# Patient Record
Sex: FEMALE | Race: OTHER | Employment: OTHER | URBAN - METROPOLITAN AREA
[De-identification: names, ages, dates, MRNs, and addresses within clinical notes are randomized per-mention and may not be internally consistent; named-entity substitution may affect disease eponyms.]

---

## 2020-02-25 ENCOUNTER — APPOINTMENT (OUTPATIENT)
Dept: GENERAL RADIOLOGY | Facility: HOSPITAL | Age: 80
DRG: 446 | End: 2020-02-25
Attending: EMERGENCY MEDICINE
Payer: MEDICARE

## 2020-02-25 ENCOUNTER — HOSPITAL ENCOUNTER (INPATIENT)
Facility: HOSPITAL | Age: 80
LOS: 4 days | Discharge: NURSING, REHAB OR LONG-TERM CARE | DRG: 446 | End: 2020-02-29
Attending: EMERGENCY MEDICINE | Admitting: INTERNAL MEDICINE
Payer: MEDICARE

## 2020-02-25 ENCOUNTER — APPOINTMENT (OUTPATIENT)
Dept: ULTRASOUND IMAGING | Facility: HOSPITAL | Age: 80
DRG: 446 | End: 2020-02-25
Attending: EMERGENCY MEDICINE
Payer: MEDICARE

## 2020-02-25 DIAGNOSIS — K83.1 BILIARY STRICTURE: ICD-10-CM

## 2020-02-25 DIAGNOSIS — K80.33 CALCULUS OF BILE DUCT WITH ACUTE CHOLANGITIS WITH OBSTRUCTION: Primary | ICD-10-CM

## 2020-02-25 DIAGNOSIS — D64.9 ANEMIA, UNSPECIFIED TYPE: ICD-10-CM

## 2020-02-25 DIAGNOSIS — R74.8 ELEVATED LIVER ENZYMES: ICD-10-CM

## 2020-02-25 PROBLEM — D72.829 LEUKOCYTOSIS: Status: ACTIVE | Noted: 2020-02-25

## 2020-02-25 LAB
ALBUMIN SERPL-MCNC: 2.2 G/DL (ref 3.4–5)
ALP LIVER SERPL-CCNC: 329 U/L (ref 55–142)
ALT SERPL-CCNC: 53 U/L (ref 13–56)
ANION GAP SERPL CALC-SCNC: 6 MMOL/L (ref 0–18)
AST SERPL-CCNC: 86 U/L (ref 15–37)
BASOPHILS # BLD AUTO: 0.1 X10(3) UL (ref 0–0.2)
BASOPHILS NFR BLD AUTO: 0.4 %
BILIRUB DIRECT SERPL-MCNC: 2.3 MG/DL (ref 0–0.2)
BILIRUB SERPL-MCNC: 3.2 MG/DL (ref 0.1–2)
BILIRUB UR QL: NEGATIVE
BUN BLD-MCNC: 39 MG/DL (ref 7–18)
BUN/CREAT SERPL: 40.6 (ref 10–20)
CALCIUM BLD-MCNC: 8.8 MG/DL (ref 8.5–10.1)
CHLORIDE SERPL-SCNC: 101 MMOL/L (ref 98–112)
CLARITY UR: CLEAR
CO2 SERPL-SCNC: 29 MMOL/L (ref 21–32)
COLOR UR: YELLOW
CREAT BLD-MCNC: 0.96 MG/DL (ref 0.55–1.02)
DEPRECATED RDW RBC AUTO: 54.6 FL (ref 35.1–46.3)
EOSINOPHIL # BLD AUTO: 0.02 X10(3) UL (ref 0–0.7)
EOSINOPHIL NFR BLD AUTO: 0.1 %
ERYTHROCYTE [DISTWIDTH] IN BLOOD BY AUTOMATED COUNT: 16.4 % (ref 11–15)
GLUCOSE BLD-MCNC: 100 MG/DL (ref 70–99)
GLUCOSE UR-MCNC: NEGATIVE MG/DL
HCT VFR BLD AUTO: 31.4 % (ref 35–48)
HGB BLD-MCNC: 10.3 G/DL (ref 12–16)
IMM GRANULOCYTES # BLD AUTO: 0.12 X10(3) UL (ref 0–1)
IMM GRANULOCYTES NFR BLD: 0.5 %
KETONES UR-MCNC: NEGATIVE MG/DL
LACTATE SERPL-SCNC: 1.1 MMOL/L (ref 0.4–2)
LYMPHOCYTES # BLD AUTO: 1.25 X10(3) UL (ref 1–4)
LYMPHOCYTES NFR BLD AUTO: 5 %
M PROTEIN MFR SERPL ELPH: 6.9 G/DL (ref 6.4–8.2)
MCH RBC QN AUTO: 30.1 PG (ref 26–34)
MCHC RBC AUTO-ENTMCNC: 32.8 G/DL (ref 31–37)
MCV RBC AUTO: 91.8 FL (ref 80–100)
MONOCYTES # BLD AUTO: 0.94 X10(3) UL (ref 0.1–1)
MONOCYTES NFR BLD AUTO: 3.7 %
NEUTROPHILS # BLD AUTO: 22.69 X10 (3) UL (ref 1.5–7.7)
NEUTROPHILS # BLD AUTO: 22.69 X10(3) UL (ref 1.5–7.7)
NEUTROPHILS NFR BLD AUTO: 90.3 %
NITRITE UR QL STRIP.AUTO: NEGATIVE
OSMOLALITY SERPL CALC.SUM OF ELEC: 291 MOSM/KG (ref 275–295)
PH UR: 5 [PH] (ref 5–8)
PLATELET # BLD AUTO: 189 10(3)UL (ref 150–450)
POTASSIUM SERPL-SCNC: 3.3 MMOL/L (ref 3.5–5.1)
PROT UR-MCNC: NEGATIVE MG/DL
RBC # BLD AUTO: 3.42 X10(6)UL (ref 3.8–5.3)
RBC #/AREA URNS AUTO: 1 /HPF
SODIUM SERPL-SCNC: 136 MMOL/L (ref 136–145)
SP GR UR STRIP: 1.01 (ref 1–1.03)
UROBILINOGEN UR STRIP-ACNC: <2
WBC # BLD AUTO: 25.1 X10(3) UL (ref 4–11)
WBC #/AREA URNS AUTO: 6 /HPF

## 2020-02-25 PROCEDURE — 71045 X-RAY EXAM CHEST 1 VIEW: CPT | Performed by: EMERGENCY MEDICINE

## 2020-02-25 PROCEDURE — 76705 ECHO EXAM OF ABDOMEN: CPT | Performed by: EMERGENCY MEDICINE

## 2020-02-25 RX ORDER — SODIUM CHLORIDE 9 MG/ML
INJECTION, SOLUTION INTRAVENOUS CONTINUOUS
Status: DISCONTINUED | OUTPATIENT
Start: 2020-02-25 | End: 2020-02-27

## 2020-02-25 RX ORDER — ACETAMINOPHEN 325 MG/1
650 TABLET ORAL EVERY 6 HOURS PRN
Status: DISCONTINUED | OUTPATIENT
Start: 2020-02-25 | End: 2020-02-29

## 2020-02-25 RX ORDER — ZOLPIDEM TARTRATE 5 MG/1
5 TABLET ORAL NIGHTLY PRN
COMMUNITY

## 2020-02-25 RX ORDER — MORPHINE SULFATE 4 MG/ML
2 INJECTION, SOLUTION INTRAMUSCULAR; INTRAVENOUS ONCE
Status: COMPLETED | OUTPATIENT
Start: 2020-02-25 | End: 2020-02-25

## 2020-02-25 RX ORDER — ONDANSETRON 2 MG/ML
4 INJECTION INTRAMUSCULAR; INTRAVENOUS EVERY 6 HOURS PRN
Status: DISCONTINUED | OUTPATIENT
Start: 2020-02-25 | End: 2020-02-29

## 2020-02-25 RX ORDER — MULTIVIT-MIN/FOLIC ACID/LUTEIN 400-250MCG
1 TABLET,CHEWABLE ORAL DAILY
COMMUNITY

## 2020-02-25 RX ORDER — MORPHINE SULFATE 2 MG/ML
1 INJECTION, SOLUTION INTRAMUSCULAR; INTRAVENOUS EVERY 4 HOURS PRN
Status: DISCONTINUED | OUTPATIENT
Start: 2020-02-25 | End: 2020-02-29

## 2020-02-25 RX ORDER — HEPARIN SODIUM 5000 [USP'U]/ML
5000 INJECTION, SOLUTION INTRAVENOUS; SUBCUTANEOUS EVERY 8 HOURS SCHEDULED
Status: DISCONTINUED | OUTPATIENT
Start: 2020-02-25 | End: 2020-02-26

## 2020-02-25 RX ORDER — SODIUM CHLORIDE 9 MG/ML
INJECTION, SOLUTION INTRAVENOUS CONTINUOUS
Status: DISCONTINUED | OUTPATIENT
Start: 2020-02-25 | End: 2020-02-29

## 2020-02-25 RX ORDER — SODIUM CHLORIDE 9 MG/ML
INJECTION, SOLUTION INTRAVENOUS CONTINUOUS
Status: ACTIVE | OUTPATIENT
Start: 2020-02-25 | End: 2020-02-26

## 2020-02-25 NOTE — ED INITIAL ASSESSMENT (HPI)
Triage: pt arrived via Private ambulance for elevated WBC count 27.46 per lab sheet, from [facility] rehab, pt reports fell and broke R femur on 2/22 and had surgery and then sent from rehab

## 2020-02-26 ENCOUNTER — APPOINTMENT (OUTPATIENT)
Dept: MRI IMAGING | Facility: HOSPITAL | Age: 80
DRG: 446 | End: 2020-02-26
Attending: INTERNAL MEDICINE
Payer: MEDICARE

## 2020-02-26 ENCOUNTER — APPOINTMENT (OUTPATIENT)
Dept: GENERAL RADIOLOGY | Facility: HOSPITAL | Age: 80
DRG: 446 | End: 2020-02-26
Attending: INTERNAL MEDICINE
Payer: MEDICARE

## 2020-02-26 ENCOUNTER — ANESTHESIA (OUTPATIENT)
Dept: ENDOSCOPY | Facility: HOSPITAL | Age: 80
DRG: 446 | End: 2020-02-26
Payer: MEDICARE

## 2020-02-26 ENCOUNTER — ANESTHESIA EVENT (OUTPATIENT)
Dept: ENDOSCOPY | Facility: HOSPITAL | Age: 80
DRG: 446 | End: 2020-02-26
Payer: MEDICARE

## 2020-02-26 LAB
ALBUMIN SERPL-MCNC: 1.8 G/DL (ref 3.4–5)
ALBUMIN/GLOB SERPL: 0.5 {RATIO} (ref 1–2)
ALP LIVER SERPL-CCNC: 239 U/L (ref 55–142)
ALT SERPL-CCNC: 32 U/L (ref 13–56)
ANION GAP SERPL CALC-SCNC: 3 MMOL/L (ref 0–18)
AST SERPL-CCNC: 35 U/L (ref 15–37)
BASOPHILS # BLD AUTO: 0.08 X10(3) UL (ref 0–0.2)
BASOPHILS NFR BLD AUTO: 0.5 %
BILIRUB SERPL-MCNC: 1.9 MG/DL (ref 0.1–2)
BUN BLD-MCNC: 31 MG/DL (ref 7–18)
BUN/CREAT SERPL: 50 (ref 10–20)
CALCIUM BLD-MCNC: 8.4 MG/DL (ref 8.5–10.1)
CHLORIDE SERPL-SCNC: 111 MMOL/L (ref 98–112)
CO2 SERPL-SCNC: 27 MMOL/L (ref 21–32)
CREAT BLD-MCNC: 0.62 MG/DL (ref 0.55–1.02)
DEPRECATED HBV CORE AB SER IA-ACNC: 94.8 NG/ML (ref 18–340)
DEPRECATED RDW RBC AUTO: 55.3 FL (ref 35.1–46.3)
EOSINOPHIL # BLD AUTO: 0.02 X10(3) UL (ref 0–0.7)
EOSINOPHIL NFR BLD AUTO: 0.1 %
ERYTHROCYTE [DISTWIDTH] IN BLOOD BY AUTOMATED COUNT: 16.4 % (ref 11–15)
GLOBULIN PLAS-MCNC: 3.9 G/DL (ref 2.8–4.4)
GLUCOSE BLD-MCNC: 82 MG/DL (ref 70–99)
HCT VFR BLD AUTO: 26.7 % (ref 35–48)
HGB BLD-MCNC: 8.5 G/DL (ref 12–16)
IMM GRANULOCYTES # BLD AUTO: 0.1 X10(3) UL (ref 0–1)
IMM GRANULOCYTES NFR BLD: 0.7 %
IRON SATURATION: 22 % (ref 15–50)
IRON SERPL-MCNC: 50 UG/DL (ref 50–170)
LYMPHOCYTES # BLD AUTO: 1.46 X10(3) UL (ref 1–4)
LYMPHOCYTES NFR BLD AUTO: 9.7 %
M PROTEIN MFR SERPL ELPH: 5.7 G/DL (ref 6.4–8.2)
MCH RBC QN AUTO: 29.7 PG (ref 26–34)
MCHC RBC AUTO-ENTMCNC: 31.8 G/DL (ref 31–37)
MCV RBC AUTO: 93.4 FL (ref 80–100)
MONOCYTES # BLD AUTO: 0.94 X10(3) UL (ref 0.1–1)
MONOCYTES NFR BLD AUTO: 6.3 %
NEUTROPHILS # BLD AUTO: 12.42 X10 (3) UL (ref 1.5–7.7)
NEUTROPHILS # BLD AUTO: 12.42 X10(3) UL (ref 1.5–7.7)
NEUTROPHILS NFR BLD AUTO: 82.7 %
OSMOLALITY SERPL CALC.SUM OF ELEC: 298 MOSM/KG (ref 275–295)
PLATELET # BLD AUTO: 145 10(3)UL (ref 150–450)
POTASSIUM SERPL-SCNC: 4.2 MMOL/L (ref 3.5–5.1)
RBC # BLD AUTO: 2.86 X10(6)UL (ref 3.8–5.3)
SODIUM SERPL-SCNC: 141 MMOL/L (ref 136–145)
TOTAL IRON BINDING CAPACITY: 224 UG/DL (ref 240–450)
TRANSFERRIN SERPL-MCNC: 150 MG/DL (ref 200–360)
WBC # BLD AUTO: 15 X10(3) UL (ref 4–11)

## 2020-02-26 PROCEDURE — 0FC78ZZ EXTIRPATION OF MATTER FROM COMMON HEPATIC DUCT, VIA NATURAL OR ARTIFICIAL OPENING ENDOSCOPIC: ICD-10-PCS | Performed by: INTERNAL MEDICINE

## 2020-02-26 PROCEDURE — 74330 X-RAY BILE/PANC ENDOSCOPY: CPT | Performed by: INTERNAL MEDICINE

## 2020-02-26 PROCEDURE — 99223 1ST HOSP IP/OBS HIGH 75: CPT | Performed by: INTERNAL MEDICINE

## 2020-02-26 PROCEDURE — 43264 ERCP REMOVE DUCT CALCULI: CPT | Performed by: INTERNAL MEDICINE

## 2020-02-26 PROCEDURE — 0FB78ZX EXCISION OF COMMON HEPATIC DUCT, VIA NATURAL OR ARTIFICIAL OPENING ENDOSCOPIC, DIAGNOSTIC: ICD-10-PCS | Performed by: INTERNAL MEDICINE

## 2020-02-26 PROCEDURE — 76376 3D RENDER W/INTRP POSTPROCES: CPT | Performed by: INTERNAL MEDICINE

## 2020-02-26 PROCEDURE — 0F778DZ DILATION OF COMMON HEPATIC DUCT WITH INTRALUMINAL DEVICE, VIA NATURAL OR ARTIFICIAL OPENING ENDOSCOPIC: ICD-10-PCS | Performed by: INTERNAL MEDICINE

## 2020-02-26 PROCEDURE — 43262 ENDO CHOLANGIOPANCREATOGRAPH: CPT | Performed by: INTERNAL MEDICINE

## 2020-02-26 PROCEDURE — 74181 MRI ABDOMEN W/O CONTRAST: CPT | Performed by: INTERNAL MEDICINE

## 2020-02-26 DEVICE — COTTON-LEUNG BILIARY STENT
Type: IMPLANTABLE DEVICE | Status: FUNCTIONAL
Brand: COTTON-LEUNG

## 2020-02-26 RX ORDER — VITAMIN E 268 MG
400 CAPSULE ORAL DAILY
COMMUNITY

## 2020-02-26 RX ORDER — ZINC SULFATE 50(220)MG
50 CAPSULE ORAL DAILY
COMMUNITY

## 2020-02-26 RX ORDER — ROCURONIUM BROMIDE 10 MG/ML
INJECTION, SOLUTION INTRAVENOUS AS NEEDED
Status: DISCONTINUED | OUTPATIENT
Start: 2020-02-26 | End: 2020-02-26 | Stop reason: SURG

## 2020-02-26 RX ORDER — DEXAMETHASONE SODIUM PHOSPHATE 4 MG/ML
VIAL (ML) INJECTION AS NEEDED
Status: DISCONTINUED | OUTPATIENT
Start: 2020-02-26 | End: 2020-02-26 | Stop reason: SURG

## 2020-02-26 RX ORDER — OMEPRAZOLE 20 MG/1
20 CAPSULE, DELAYED RELEASE ORAL NIGHTLY
COMMUNITY

## 2020-02-26 RX ORDER — MAGNESIUM HYDROXIDE/ALUMINUM HYDROXICE/SIMETHICONE 120; 1200; 1200 MG/30ML; MG/30ML; MG/30ML
15 SUSPENSION ORAL 4 TIMES DAILY PRN
Status: DISCONTINUED | OUTPATIENT
Start: 2020-02-26 | End: 2020-02-29

## 2020-02-26 RX ORDER — ASPIRIN 81 MG/1
81 TABLET, CHEWABLE ORAL DAILY
Status: DISCONTINUED | OUTPATIENT
Start: 2020-02-26 | End: 2020-02-29

## 2020-02-26 RX ORDER — HYDROMORPHONE HYDROCHLORIDE 1 MG/ML
0.6 INJECTION, SOLUTION INTRAMUSCULAR; INTRAVENOUS; SUBCUTANEOUS EVERY 5 MIN PRN
Status: DISCONTINUED | OUTPATIENT
Start: 2020-02-26 | End: 2020-02-26 | Stop reason: HOSPADM

## 2020-02-26 RX ORDER — MORPHINE SULFATE 4 MG/ML
2 INJECTION, SOLUTION INTRAMUSCULAR; INTRAVENOUS EVERY 10 MIN PRN
Status: DISCONTINUED | OUTPATIENT
Start: 2020-02-26 | End: 2020-02-26 | Stop reason: HOSPADM

## 2020-02-26 RX ORDER — CALCIUM CARBONATE 200(500)MG
1 TABLET,CHEWABLE ORAL EVERY 4 HOURS PRN
COMMUNITY

## 2020-02-26 RX ORDER — NEOSTIGMINE METHYLSULFATE 1 MG/ML
INJECTION INTRAVENOUS AS NEEDED
Status: DISCONTINUED | OUTPATIENT
Start: 2020-02-26 | End: 2020-02-26 | Stop reason: SURG

## 2020-02-26 RX ORDER — HYDROCODONE BITARTRATE AND ACETAMINOPHEN 5; 325 MG/1; MG/1
2 TABLET ORAL AS NEEDED
Status: DISCONTINUED | OUTPATIENT
Start: 2020-02-26 | End: 2020-02-26 | Stop reason: HOSPADM

## 2020-02-26 RX ORDER — LIDOCAINE HYDROCHLORIDE 10 MG/ML
INJECTION, SOLUTION EPIDURAL; INFILTRATION; INTRACAUDAL; PERINEURAL AS NEEDED
Status: DISCONTINUED | OUTPATIENT
Start: 2020-02-26 | End: 2020-02-26 | Stop reason: SURG

## 2020-02-26 RX ORDER — HYDROMORPHONE HYDROCHLORIDE 1 MG/ML
0.2 INJECTION, SOLUTION INTRAMUSCULAR; INTRAVENOUS; SUBCUTANEOUS EVERY 5 MIN PRN
Status: DISCONTINUED | OUTPATIENT
Start: 2020-02-26 | End: 2020-02-26 | Stop reason: HOSPADM

## 2020-02-26 RX ORDER — HYDROCODONE BITARTRATE AND ACETAMINOPHEN 5; 325 MG/1; MG/1
1 TABLET ORAL AS NEEDED
Status: DISCONTINUED | OUTPATIENT
Start: 2020-02-26 | End: 2020-02-26 | Stop reason: HOSPADM

## 2020-02-26 RX ORDER — PROCHLORPERAZINE EDISYLATE 5 MG/ML
5 INJECTION INTRAMUSCULAR; INTRAVENOUS ONCE AS NEEDED
Status: COMPLETED | OUTPATIENT
Start: 2020-02-26 | End: 2020-02-26

## 2020-02-26 RX ORDER — MORPHINE SULFATE 4 MG/ML
4 INJECTION, SOLUTION INTRAMUSCULAR; INTRAVENOUS EVERY 10 MIN PRN
Status: DISCONTINUED | OUTPATIENT
Start: 2020-02-26 | End: 2020-02-26 | Stop reason: HOSPADM

## 2020-02-26 RX ORDER — ACETAMINOPHEN 500 MG
500 TABLET ORAL EVERY 4 HOURS PRN
COMMUNITY

## 2020-02-26 RX ORDER — HYDROCODONE BITARTRATE AND ACETAMINOPHEN 5; 325 MG/1; MG/1
1 TABLET ORAL EVERY 6 HOURS PRN
Status: DISCONTINUED | OUTPATIENT
Start: 2020-02-26 | End: 2020-02-29

## 2020-02-26 RX ORDER — GLYCOPYRROLATE 0.2 MG/ML
INJECTION INTRAMUSCULAR; INTRAVENOUS AS NEEDED
Status: DISCONTINUED | OUTPATIENT
Start: 2020-02-26 | End: 2020-02-26 | Stop reason: SURG

## 2020-02-26 RX ORDER — SODIUM CHLORIDE, SODIUM LACTATE, POTASSIUM CHLORIDE, CALCIUM CHLORIDE 600; 310; 30; 20 MG/100ML; MG/100ML; MG/100ML; MG/100ML
INJECTION, SOLUTION INTRAVENOUS CONTINUOUS
Status: DISCONTINUED | OUTPATIENT
Start: 2020-02-26 | End: 2020-02-26 | Stop reason: HOSPADM

## 2020-02-26 RX ORDER — NALOXONE HYDROCHLORIDE 0.4 MG/ML
80 INJECTION, SOLUTION INTRAMUSCULAR; INTRAVENOUS; SUBCUTANEOUS AS NEEDED
Status: DISCONTINUED | OUTPATIENT
Start: 2020-02-26 | End: 2020-02-26 | Stop reason: HOSPADM

## 2020-02-26 RX ORDER — ZOLPIDEM TARTRATE 5 MG/1
5 TABLET ORAL NIGHTLY PRN
Status: DISCONTINUED | OUTPATIENT
Start: 2020-02-26 | End: 2020-02-29

## 2020-02-26 RX ORDER — ENOXAPARIN SODIUM 100 MG/ML
40 INJECTION SUBCUTANEOUS DAILY
COMMUNITY
End: 2021-03-10 | Stop reason: ALTCHOICE

## 2020-02-26 RX ORDER — ONDANSETRON 2 MG/ML
4 INJECTION INTRAMUSCULAR; INTRAVENOUS ONCE AS NEEDED
Status: DISCONTINUED | OUTPATIENT
Start: 2020-02-26 | End: 2020-02-26 | Stop reason: HOSPADM

## 2020-02-26 RX ORDER — HYDROCODONE BITARTRATE AND ACETAMINOPHEN 5; 325 MG/1; MG/1
1 TABLET ORAL EVERY 6 HOURS PRN
Status: ON HOLD | COMMUNITY
End: 2020-02-29

## 2020-02-26 RX ORDER — MORPHINE SULFATE 10 MG/ML
6 INJECTION, SOLUTION INTRAMUSCULAR; INTRAVENOUS EVERY 10 MIN PRN
Status: DISCONTINUED | OUTPATIENT
Start: 2020-02-26 | End: 2020-02-26 | Stop reason: HOSPADM

## 2020-02-26 RX ORDER — HYDROMORPHONE HYDROCHLORIDE 1 MG/ML
0.4 INJECTION, SOLUTION INTRAMUSCULAR; INTRAVENOUS; SUBCUTANEOUS EVERY 5 MIN PRN
Status: DISCONTINUED | OUTPATIENT
Start: 2020-02-26 | End: 2020-02-26 | Stop reason: HOSPADM

## 2020-02-26 RX ORDER — HALOPERIDOL 5 MG/ML
0.25 INJECTION INTRAMUSCULAR ONCE AS NEEDED
Status: DISCONTINUED | OUTPATIENT
Start: 2020-02-26 | End: 2020-02-26 | Stop reason: HOSPADM

## 2020-02-26 RX ORDER — PANTOPRAZOLE SODIUM 40 MG/1
40 TABLET, DELAYED RELEASE ORAL
Status: DISCONTINUED | OUTPATIENT
Start: 2020-02-26 | End: 2020-02-29

## 2020-02-26 RX ORDER — ALUMINA, MAGNESIA, AND SIMETHICONE 2400; 2400; 240 MG/30ML; MG/30ML; MG/30ML
15 SUSPENSION ORAL 4 TIMES DAILY PRN
COMMUNITY

## 2020-02-26 RX ADMIN — GLYCOPYRROLATE 0.6 MG: 0.2 INJECTION INTRAMUSCULAR; INTRAVENOUS at 17:11:00

## 2020-02-26 RX ADMIN — DEXAMETHASONE SODIUM PHOSPHATE 8 MG: 4 MG/ML VIAL (ML) INJECTION at 16:32:00

## 2020-02-26 RX ADMIN — ROCURONIUM BROMIDE 40 MG: 10 INJECTION, SOLUTION INTRAVENOUS at 16:20:00

## 2020-02-26 RX ADMIN — SODIUM CHLORIDE: 9 INJECTION, SOLUTION INTRAVENOUS at 17:14:00

## 2020-02-26 RX ADMIN — NEOSTIGMINE METHYLSULFATE 5 MG: 1 INJECTION INTRAVENOUS at 17:11:00

## 2020-02-26 RX ADMIN — SODIUM CHLORIDE: 9 INJECTION, SOLUTION INTRAVENOUS at 16:18:00

## 2020-02-26 RX ADMIN — LIDOCAINE HYDROCHLORIDE 50 MG: 10 INJECTION, SOLUTION EPIDURAL; INFILTRATION; INTRACAUDAL; PERINEURAL at 16:20:00

## 2020-02-26 NOTE — ADDENDUM NOTE
Addendum  created 02/26/20 1726 by Radhames Jones, CRNA    Review and Sign - Ready for Procedure, Review and Sign - Signed

## 2020-02-26 NOTE — CONSULTS
[facility]   Gastroenterology Consultation Note    Douglas Palmyra  Patient Status:    Inpatient  Date of Admission:         2/25/2020, Hospital day #1  Attending:   Josefina Boone MD  PCP:     No primary care provider on file. She says she has had a colonoscopy but cannot remember when it was several years ago, thinks it was normal.    Denies any smoking or alcohol use.     History:  Past Medical History:   Diagnosis Date   • Anemia    • Arthritis    • Diverticulitis    • Esophag intolerance and heat intolerance  MUSCULOSKELETAL:  negative for joint effusion/severe erythema  BEHAVIOR/PSYCH:  negative for psychotic behavior    Physical Exam:    Blood pressure 115/58, pulse 88, temperature 97.9 °F (36.6 °C), temperature source Oral, with on and off symptoms of what appeared to be biliary colic, have worsened in the last 1 week. She recently also had a left leg fracture and is recovering after surgery and rehabilitation. Her mobility is somewhat impaired.   Her LFTs have improved over

## 2020-02-26 NOTE — PHYSICAL THERAPY NOTE
PHYSICAL THERAPY EVALUATION - INPATIENT     Room Number: 534/534-A  Evaluation Date: 2/26/2020  Type of Evaluation: Initial   Physician Order: PT Eval and Treat    Presenting Problem: s/p ORIF LLE femur fx sx 2/22(came from [facility])  Reason for Therapy: Mobil this time staff A x 2 with all transfers. Patient will benefit from continued IP PT services to address these deficits in preparation for discharge.     DISCHARGE RECOMMENDATIONS  PT Discharge Recommendations: Sub-acute rehabilitation(PT / OT)    PLAN  P 3  Location: LLE   Management Techniques: Activity promotion; Body mechanics;Breathing techniques;Relaxation;Repositioning    COGNITION  · Overall Cognitive Status:  WFL - within functional limits    RANGE OF MOTION AND STRENGTH ASSESSMENT  Upper extremity mobility  Body mechanics  Energy conservation  Functional activity tolerated  Gait training  Posture  Strengthening  Lower therapeutic exercise: Ankle pumps  Heel slides  SLR  Transfer training    Patient End of Session: Up in chair; With [facility] staff;Needs me

## 2020-02-26 NOTE — H&P
History & Physical Examination    Patient Name: Albertina Sy  MRN: [id number]  CSN: [id number]  YOB: 1940    Diagnosis: elevated liver enzymes, choledocholithiasis, abnormal MRCP        Enoxaparin Sodium 40 MG/0.4ML Subcutaneous Soluti PRN  Pantoprazole Sodium (PROTONIX) EC tab 40 mg, 40 mg, Oral, QAM AC  Zolpidem Tartrate (AMBIEN) tab 5 mg, 5 mg, Oral, Nightly PRN  Piperacillin Sod-Tazobactam So (ZOSYN) 3.375 g in dextrose 5 % 100 mL ADD-vantage, 3.375 g, Intravenous, Q8H  0.9% NaCl inf anti-coagulation. Seems she had been receiving enoxaparin injections at her nursing facility last yesterday morning. Has only been getting sub q heparin here for DVT prophylaxis and thus ok to proceed at this time.     ERCP Consent: I have discussed the ris

## 2020-02-26 NOTE — ANESTHESIA PROCEDURE NOTES
Airway  Date/Time: 2/26/2020 4:24 PM  Urgency: elective    Airway not difficult    General Information and Staff    Patient location during procedure: endo  Resident/CRNA: Donna Her, CRNA  Performed: CRNA     Indications and Patient Condition

## 2020-02-26 NOTE — ED PROVIDER NOTES
Patient Seen in: [facility] AND [facility] Emergency Department      History   Patient presents with:  Abnormal Labs    Stated Complaint: abnormal labs    HPI    The patient is a 66-year-old female who is status post ORIF of her left femur on 2/22 at [facility] Conjunctivae normal.      Pupils: Pupils are equal, round, and reactive to light. Neck:      Musculoskeletal: Normal range of motion and neck supple. Vascular: No JVD. Cardiovascular:      Rate and Rhythm: Normal rate and regular rhythm.       Hear All other components within normal limits   CBC W/ DIFFERENTIAL - Abnormal; Notable for the following components:    WBC 25.1 (*)     RBC 3.42 (*)     HGB 10.3 (*)     HCT 31.4 (*)     RDW-SD 54.6 (*)     RDW 16.4 (*)     Neutrophil Absolute Prelim 22.69 ( obstruction  (primary encounter diagnosis)  Anemia, unspecified type    Disposition:  Admit  2/25/2020  7:08 pm    Follow-up:  No follow-up provider specified.   We recommend that you schedule follow up care with a primary care provider within the next thre

## 2020-02-26 NOTE — CM/SW NOTE
CM met with pt at bedside. Pt is agreeable to return to [facility] to complete rehab. Paige liaison notified, updates sent via [address] [place] [address]. / to remain available for support and/or discharge planning.      Laz Joy RN  Case Manag

## 2020-02-26 NOTE — H&P
[address] [facility] Patient Status:  Inpatient    1940 MRN [id number]   Location [facility] 5SW/SE Attending Joni Germain MD   [facility] Day # 1 PCP No primary care provider on file.      Date: 2/25/2020 at Unknown time  omeprazole 20 MG Oral Capsule Delayed Release, Take 20 mg by mouth nightly., Disp: , Rfl: , 2/25/2020 at Unknown time  Calcium Carbonate Antacid 500 MG Oral Chew Tab, Chew 1 tablet by mouth every 4 (four) hours as needed for Hear distress.   HEENT: PERRLA  Neck: Supple, no carotid bruit or JVD  Lungs: Clear to auscultation bilaterally, No wheezes, crackles  Heart: Regular rate and rhythm, S1 and S2 normal, no murmur, rub or gallop  Abdomen: Soft, non-tender, non distended, Bs+, no o

## 2020-02-26 NOTE — PAYOR COMM NOTE
--------------  ADMISSION REVIEW     PayorSamie Fees [facility]  Subscriber #:  [id number]  Authorization Number: [id number]    Admit date: 2/25/20  Admit time: 2246       Admitting Physician: Telma Sánchez MD  Attending Physician:  Telma Sánchez MD  Primary Car METABOLIC PANEL (8) - Abnormal; Notable for the following components:       Result Value    Glucose 100 (*)     Potassium 3.3 (*)     BUN 39 (*)     BUN/CREA Ratio 40.6 (*)     GFR, Non- 56 (*)     All other components within normal limits unremarkable.   OTHER:   There is no hydronephrosis of the right kidney.     CONCLUSION:      Dilated common bile duct containing a 3.3 cm echogenic focus along the proximal aspect of the common bile duct suspicious for choledocholithiasis.     No intrahepa improved to 1.9 today. Her AST ALT also normalized. Her alk phos is mildly elevated as well. She had a leukocytosis to 5 25,000, which improved to 15,000 today after fluids and antibiotics. Her hemoglobin also declined to 8.5 from 10. 3.     Currently s choledocholithiasis. Her ultrasound does show a dilated common bile duct continued 3.3 cm focus in the proximal aspect of the common bile duct. We will proceed with MRCP for better evaluation and to confirm no malignancy is present.   I discussed with her Dose Route User    2/26/2020 0037 New Bag 40 mEq Intravenous Jim Neil RN      0.9% NaCl infusion     Date Action Dose Route User    2/25/2020 1635 New Bag (none) Intravenous Justin Beckford RN      0.9% NaCl infusion     Date Action Dose Route Use

## 2020-02-26 NOTE — ANESTHESIA POSTPROCEDURE EVALUATION
Patient: Napoleon Alba    Procedure Summary     Date:  02/26/20 Room / Location:  [address] [place] [address] ENDOSCOPY 05 / [address] [place] [address] ENDOSCOPY    Anesthesia Start:  2074 Anesthesia Stop:      Procedure:  ENDOSCOPIC RETROGRADE CHOLANGIOPANCREATOGRAPHY (ERCP) (N/A ) Diagnosis:

## 2020-02-26 NOTE — ANESTHESIA PREPROCEDURE EVALUATION
Anesthesia PreOp Note    HPI:     Bettina Louie is a 78year old female who presents for preoperative consultation requested by: Denis Burns MD    Date of Surgery: 2/25/2020 - 2/26/2020    Procedure(s):  ENDOSCOPIC RETROGRADE [place] Unknown time  zinc sulfate 220 (50 Zn) MG Oral Cap, Take 50 mg by mouth daily. , Disp: , Rfl: , 2/25/2020 at Unknown time  Zolpidem Tartrate 5 MG Oral Tab, Take 5 mg by mouth nightly as needed for Sleep., Disp: , Rfl: , 2/25/2020 at Unknown time  aspirin 81 Spouse name: Not on file      Number of children: Not on file      Years of education: Not on file      Highest education level: Not on file    Occupational History      Not on file    Social Needs      Financial resource strain: Not on file      Food i kg/m². height is 1.524 m (5') and weight is 66.3 kg (146 lb 4 oz). Her oral temperature is 98 °F (36.7 °C). Her blood pressure is 124/61 and her pulse is 91. Her respiration is 18 and oxygen saturation is 96%.     02/26/20  0835 02/26/20  1318 02/26/20  1

## 2020-02-26 NOTE — ED NOTES
Orders for admission, patient is aware of plan and ready to go upstairs. Any questions, please call ED RN Wicho Hall  at extension [phone]. Morphine administered. Fluids started. Family at bedside.

## 2020-02-26 NOTE — PLAN OF CARE
Problem: Patient Centered Care  Goal: Patient preferences are identified and integrated in the patient's plan of care  Description  Interventions:  - What would you like us to know as we care for you?  \"I fell in the alley and fractured my left femur\" and pre-medicate as appropriate  Outcome: Progressing  Note:   C/O RUQ pain, received Morphine in the ER, has been pain free since.  Will CPM.     Problem: RISK FOR INFECTION - ADULT  Goal: Absence of fever/infection during anticipated neutropenic period  D Communication/Language Barrier  Goal: Patient/Family is able to understand and participate in their care  Description  Interventions:  - Assess communication ability and preferred communication style  - Implement communication aides and strategies  - Use v

## 2020-02-26 NOTE — OCCUPATIONAL THERAPY NOTE
OCCUPATIONAL THERAPY EVALUATION - INPATIENT     Room Number: 534/534-A  Evaluation Date: 2/26/2020  Type of Evaluation: Initial  Presenting Problem: elevated WBC, upper R quad pain after eating     Physician Order: IP Consult to Occupational Therapy  Venita in chair push up while maintaining NWB of LLE with arms and butt squeezes to maintain strength. The patient's Approx Degree of Impairment: 50.11% has been calculated based on documentation in the [facility] '6 clicks' Inpatient Daily Activity Short Form.   R dizziness     O2 SATURATIONS  Room air     COGNITION  Overall Cognitive Status:  WFL - within functional limits      RANGE OF MOTION   Upper extremity ROM is within functional limits     STRENGTH ASSESSMENT  Upper extremity strength is within functional li session/findings; All patient questions and concerns addressed; Alarm set    OT Goals  Patients self stated goal is: ALVARO     Patient will complete functional transfer with min A  Comment:     Patient will complete toileting with min A  Comment:     Patient w

## 2020-02-26 NOTE — OPERATIVE REPORT
Endoscopic retrograde cholangio-pancreatography (ERCP) report    Maxine Bender     1940 Age 78year old   PCP No primary care provider on file.  Endoscopist Mona Vazquez MD     Date of procedure: 20    Procedure: ERCP w/ sphinctero advanced into common bile duct and unable to be advanced further. Contrast was injected confirming biliary cannulation. A biliary sphincterotomy was performed without post-sphincterotomy bleeding.  The sphinctertome was removed and exchanged for a 9-12 mm b

## 2020-02-27 ENCOUNTER — TELEPHONE (OUTPATIENT)
Dept: GASTROENTEROLOGY | Facility: CLINIC | Age: 80
End: 2020-02-27

## 2020-02-27 LAB
ALBUMIN SERPL-MCNC: 1.8 G/DL (ref 3.4–5)
ALP LIVER SERPL-CCNC: 293 U/L (ref 55–142)
ALT SERPL-CCNC: 26 U/L (ref 13–56)
AST SERPL-CCNC: 22 U/L (ref 15–37)
BASOPHILS # BLD AUTO: 0.05 X10(3) UL (ref 0–0.2)
BASOPHILS NFR BLD AUTO: 0.5 %
BILIRUB DIRECT SERPL-MCNC: 1.1 MG/DL (ref 0–0.2)
BILIRUB SERPL-MCNC: 1.6 MG/DL (ref 0.1–2)
DEPRECATED RDW RBC AUTO: 53.4 FL (ref 35.1–46.3)
EOSINOPHIL # BLD AUTO: 0 X10(3) UL (ref 0–0.7)
EOSINOPHIL NFR BLD AUTO: 0 %
ERYTHROCYTE [DISTWIDTH] IN BLOOD BY AUTOMATED COUNT: 16 % (ref 11–15)
HCT VFR BLD AUTO: 27.9 % (ref 35–48)
HGB BLD-MCNC: 9.1 G/DL (ref 12–16)
IMM GRANULOCYTES # BLD AUTO: 0.36 X10(3) UL (ref 0–1)
IMM GRANULOCYTES NFR BLD: 3.6 %
LYMPHOCYTES # BLD AUTO: 0.95 X10(3) UL (ref 1–4)
LYMPHOCYTES NFR BLD AUTO: 9.6 %
M PROTEIN MFR SERPL ELPH: 5.9 G/DL (ref 6.4–8.2)
MCH RBC QN AUTO: 29.7 PG (ref 26–34)
MCHC RBC AUTO-ENTMCNC: 32.6 G/DL (ref 31–37)
MCV RBC AUTO: 91.2 FL (ref 80–100)
MONOCYTES # BLD AUTO: 0.46 X10(3) UL (ref 0.1–1)
MONOCYTES NFR BLD AUTO: 4.6 %
NEUTROPHILS # BLD AUTO: 8.12 X10 (3) UL (ref 1.5–7.7)
NEUTROPHILS # BLD AUTO: 8.12 X10(3) UL (ref 1.5–7.7)
NEUTROPHILS NFR BLD AUTO: 81.7 %
PLATELET # BLD AUTO: 153 10(3)UL (ref 150–450)
RBC # BLD AUTO: 3.06 X10(6)UL (ref 3.8–5.3)
WBC # BLD AUTO: 9.9 X10(3) UL (ref 4–11)

## 2020-02-27 PROCEDURE — 99232 SBSQ HOSP IP/OBS MODERATE 35: CPT | Performed by: INTERNAL MEDICINE

## 2020-02-27 NOTE — PAYOR COMM NOTE
--------------  CONTINUED STAY REVIEW     Prem Torres MA [facility]  Subscriber #:  [id number]  Authorization Number: [id number]    History & Physical           Karyle Shoe Patient Status:  Inpatient    1940 MRN [id number]   [facility] , Rfl: , 2/25/2020 at Unknown time  HYDROcodone-acetaminophen 5-325 MG Oral Tab, Take 1 tablet by mouth every 6 (six) hours as needed for Pain., Disp: , Rfl: , 2/25/2020 at Unknown time  omeprazole 20 MG Oral Capsule Delayed Release, Take 20 mg by mouth ni Temp 98 °F (36.7 °C) (Oral)   Resp 18   Ht 5' (1.524 m)   Wt 146 lb 4 oz (66.3 kg)   SpO2 97%   BMI 28.56 kg/m²      General Appearance:  Alert, Awake, not in acute distress.   HEENT: PERRLA  Neck: Supple, no carotid bruit or JVD  Lungs: Clear to auscultati procedure: 02/26/20     Procedure: ERCP w/ sphincterotomy, brushing, stone removal, stent placement      Pre-operative diagnosis: RUQ abdominal pain, elevated liver enzymes, choledocholithiasis, abnormal MRCP      Post-operative diagnosis: biliary strictur fr x 9 cm plastic biliary stent)  2. Small stone fragments and sludge removed  3. Dilated bile duct     Recommend:   1. Monitor liver enzymes  2. Clear liquid diet as tolerated  3. Await final cytopathology results  4.  Repeat ERCP with stent exchange/remov mini  #L femur ORIF- on 2/22/20  #Anemia     MRCP on 2/26 showed biliary ductal dilatation---> stricture in the proximal common duct with biliary debris/sludge.  ERCP done on 2/26 with -->ERCP w/ sphincterotomy, brushing, stone removal, stent p Bag 3.375 g Intravenous Yunier Payton RN    2/26/2020 1513 New Bag 3.375 g Intravenous Neva Melendrez RN      Prochlorperazine Edisylate (COMPAZINE) injection 5 mg     Date Action Dose Route User    2/26/2020 1740 Given 5 mg Intravenous EDILSON Salinas

## 2020-02-27 NOTE — PLAN OF CARE
Problem: Patient Centered Care  Goal: Patient preferences are identified and integrated in the patient's plan of care  Description  Interventions:  - What would you like us to know as we care for you? Patient plans to go back to [facility] for completing rehab. anticipated neutropenic period  Description  INTERVENTIONS  - Monitor WBC  - Administer growth factors as ordered  - Implement neutropenic guidelines  Outcome: Progressing     Problem: SAFETY ADULT - FALL  Goal: Free from fall injury  Description  INTERVEN aides and strategies  - Use visual cues when possible  - Listen attentively, be patient, do not interrupt  - Minimize distractions  - Allow time for understanding and response  - Establish method for patient to ask for assistance (call light)  - Provide an

## 2020-02-27 NOTE — PLAN OF CARE
Patient able to tolerate low fiber/soft diet. Possible discharge tomorrow. Will continue to monitor.     Problem: Patient Centered Care  Goal: Patient preferences are identified and integrated in the patient's plan of care  Description  Interventions:  - Wh appropriate  Outcome: Progressing     Problem: RISK FOR INFECTION - ADULT  Goal: Absence of fever/infection during anticipated neutropenic period  Description  INTERVENTIONS  - Monitor WBC  - Administer growth factors as ordered  - Implement neutropenic gu care  Description  Interventions:  - Assess communication ability and preferred communication style  - Implement communication aides and strategies  - Use visual cues when possible  - Listen attentively, be patient, do not interrupt  - Minimize distraction

## 2020-02-27 NOTE — PHYSICAL THERAPY NOTE
PHYSICAL THERAPY TREATMENT NOTE - INPATIENT     Room Number: 471/038-N       Presenting Problem: s/p ORIF LLE femur fx sx 2/22(came from [facility])    Problem List  Principal Problem:    Calculus of bile duct with acute cholangitis with obstruction  Active Probl OBJECTIVE  Precautions: Knee immobilizer;Limb alert - left(LLE NWB with immobilizer)    WEIGHT BEARING RESTRICTION  Weight Bearing Restriction: L lower extremity           L Lower Extremity: Non-Weight Bearing    PAIN ASSESSMENT   Ratin  Location: toelrated in pain free ROM     Position Supine       Patient End of Session: In bed; With [facility] staff;Needs met;Call light within reach;RN aware of session/findings; All patient questions and concerns addressed; Ice applied; Family present;Bracing education provi

## 2020-02-27 NOTE — PROGRESS NOTES
[facility]     Gastroenterology Progress Note    Félix Morrison Patient Status:  Inpatient    1940 MRN [id number]   Location [facility] 5SW/SE Attending Manny Cervantes MD   Hosp Day # 2 PCP No primary care provid HCT 27.9 (L) 02/27/2020    .0 02/27/2020    CREATSERUM 0.62 02/26/2020    BUN 31 (H) 02/26/2020     02/26/2020    K 4.2 02/26/2020     02/26/2020    CO2 27.0 02/26/2020    GLU 82 02/26/2020    CA 8.4 (L) 02/26/2020    ALB 1.8 (L) 02/27/2

## 2020-02-27 NOTE — PROGRESS NOTES
[place] [facility] - [facility]    Progress Note    Maxine Bender Patient Status:  Inpatient    1940 MRN [id number]   Location [facility] 5SW/SE Attending Joni Germain MD   [facility] Day # 2 PCP No primary care provider on file.      SUBJECTIVE injection 4 mg, 4 mg, Intravenous, Q6H PRN  morphINE sulfate (PF) 2 MG/ML injection 1 mg, 1 mg, Intravenous, Q4H PRN  0.9% NaCl infusion, , Intravenous, Continuous        Assessment:  Patient Active Problem List:     Leukocytosis     Calculus of bile duct

## 2020-02-27 NOTE — DIETARY NOTE
ADULT NUTRITION INITIAL ASSESSMENT    Pt is at moderate nutrition risk. Pt does not meet malnutrition criteria.       NUTRITION DIAGNOSIS/PROBLEM:  Inadequate enteral nutrition infusion related to altered GI function d/t bile duct obstruction as evidenced appears appetite is returning and excited to order for  soft diet  at lunch. Intake: 0% yesterday. Some  Liquids on Full liq diet. Will monitor intake on Soft.    Intake Meeting Needs: No   Food Allergies: No   Cultural/Ethnic/Yazdanism Preferences: None

## 2020-02-28 ENCOUNTER — MED REC SCAN ONLY (OUTPATIENT)
Dept: GASTROENTEROLOGY | Facility: CLINIC | Age: 80
End: 2020-02-28

## 2020-02-28 LAB
ALBUMIN SERPL-MCNC: 1.7 G/DL (ref 3.4–5)
ALP LIVER SERPL-CCNC: 255 U/L (ref 55–142)
ALT SERPL-CCNC: 19 U/L (ref 13–56)
AST SERPL-CCNC: 18 U/L (ref 15–37)
BILIRUB DIRECT SERPL-MCNC: 0.9 MG/DL (ref 0–0.2)
BILIRUB SERPL-MCNC: 1.2 MG/DL (ref 0.1–2)
M PROTEIN MFR SERPL ELPH: 5.4 G/DL (ref 6.4–8.2)

## 2020-02-28 PROCEDURE — 99232 SBSQ HOSP IP/OBS MODERATE 35: CPT | Performed by: INTERNAL MEDICINE

## 2020-02-28 RX ORDER — DOCUSATE SODIUM 100 MG/1
100 CAPSULE, LIQUID FILLED ORAL 2 TIMES DAILY
Status: DISCONTINUED | OUTPATIENT
Start: 2020-02-28 | End: 2020-02-29

## 2020-02-28 RX ORDER — LEVOFLOXACIN 750 MG/1
750 TABLET ORAL DAILY
Qty: 7 TABLET | Refills: 0 | Status: SHIPPED | OUTPATIENT
Start: 2020-02-29 | End: 2020-03-07

## 2020-02-28 RX ORDER — METRONIDAZOLE 500 MG/1
500 TABLET ORAL EVERY 8 HOURS SCHEDULED
Status: DISCONTINUED | OUTPATIENT
Start: 2020-02-28 | End: 2020-02-29

## 2020-02-28 RX ORDER — METRONIDAZOLE 500 MG/1
500 TABLET ORAL EVERY 8 HOURS SCHEDULED
Qty: 21 TABLET | Refills: 0 | Status: SHIPPED | OUTPATIENT
Start: 2020-02-28 | End: 2020-03-06

## 2020-02-28 RX ORDER — LEVOFLOXACIN 750 MG/1
750 TABLET ORAL DAILY
Status: DISCONTINUED | OUTPATIENT
Start: 2020-02-28 | End: 2020-02-29

## 2020-02-28 NOTE — PLAN OF CARE
Pt alert. Reports some abdominal discomfort. Prn tylenol given. No nausea or vomiting at this time. Immobilizer in place to left leg. IV to left antecubital leaking. Unable to establish new site X 2 RNs, X3 attempts.   Redressed current IV and tighten Consider cultural and social influences on pain and pain management  - Manage/alleviate anxiety  - Utilize distraction and/or relaxation techniques  - Monitor for opioid side effects  - Notify MD/LIP if interventions unsuccessful or patient reports new prashant if the patient needs post-hospital services based on physician/LIP order or complex needs related to functional status, cognitive ability or social support system  Outcome: Progressing     Problem: Altered Communication/Language Barrier  Goal: Patient/Fami

## 2020-02-28 NOTE — PROGRESS NOTES
[facility]     Gastroenterology Progress Note    Lucie Valiente Patient Status:  Inpatient    1940 MRN [id number]   Location [facility] 5SW/SE Attending Pauline Puente MD   [facility] Day # 3 PCP No primary care provid patient to arrange follow-up. Will need stent removal, etc.   -DVT ppx        S.  Radha Busby MD  [address] [facility] Gastroenterology      Results:     Lab Results   Component Value Date    WBC 9.9 02/27/2020    HGB 9.1 (L) 02/27/2020    HCT 27.9 (L) 02/27/2

## 2020-02-28 NOTE — DISCHARGE SUMMARY
[facility]    Discharge Summary    Guanacomaxine Varelan Patient Status:  Inpatient    1940 MRN [id number]   Location [facility] 5SW/SE Attending Rubina Ford MD   [facility] Day # 3 PCP No primary care provider on file.      Date noted to have WBC of 20 5K along with elevated alkaline phosphatase. Ultrasound right upper quadrant showed significant dilatation of CBD. Patient denies chest pain, shortness of breath, dysuria, frequency, hematemesis, melena.     Hospital Course:       mouth 4 (four) times daily as needed for Indigestion. Refills:  0     aspirin 81 MG Tabs      Take 81 mg by mouth daily.    Refills:  0     Calcium Carbonate Antacid 500 MG Chew  Commonly known as:  TUMS      Chew 1 tablet by mouth every 4 (four) hours as

## 2020-02-28 NOTE — PROGRESS NOTES
Received a call from Sally Kessler, the  of [facility], stating that the patient has authorization to go to [facility]. He stated that the authorization was obtained from [facility] the Admissions Director there.  [phone] (Sam's contact

## 2020-02-28 NOTE — CM/SW NOTE
OMAIRA contacted by Margoth Haider from [facility] [phone] to ask if they should start auth for pt to return. OMAIRA confirmed yes, pt is possibly dc ready today or over the weekend. [facility] contacted OMAIRA to ask if we have ins auth.  OMAIRA said we have not rec'd notic

## 2020-02-28 NOTE — PROGRESS NOTES
[place] [facility] - [facility]    Progress Note    Jazlyn Gonzalez Patient Status:  Inpatient    1940 MRN [id number]   Location [facility] 5SW/SE Attending Marcelino Baker MD   [facility] Day # 3 PCP No primary care provider on file.      SUBJECTIVE Intravenous, Continuous        Assessment:  Leukocytosis     Calculus of bile duct with acute cholangitis with obstruction     Anemia, unspecified type     1.  Acute on chronic abdominal pain  2.  Abnormal LFT.   History of cholecystectomy  3.  Dilated CBD

## 2020-02-28 NOTE — CM/SW NOTE
Updates sent via ISR/[employer] to [facility].     TENNILLE Young, [facility] Work   OBD:#[id number]

## 2020-02-29 VITALS
BODY MASS INDEX: 29.62 KG/M2 | RESPIRATION RATE: 18 BRPM | WEIGHT: 150.88 LBS | OXYGEN SATURATION: 97 % | DIASTOLIC BLOOD PRESSURE: 59 MMHG | HEART RATE: 71 BPM | SYSTOLIC BLOOD PRESSURE: 92 MMHG | HEIGHT: 60 IN | TEMPERATURE: 98 F

## 2020-02-29 LAB
ALBUMIN SERPL-MCNC: 1.8 G/DL (ref 3.4–5)
ALBUMIN/GLOB SERPL: 0.5 {RATIO} (ref 1–2)
ALP LIVER SERPL-CCNC: 257 U/L (ref 55–142)
ALT SERPL-CCNC: 14 U/L (ref 13–56)
ANION GAP SERPL CALC-SCNC: 4 MMOL/L (ref 0–18)
AST SERPL-CCNC: 14 U/L (ref 15–37)
BILIRUB SERPL-MCNC: 1.1 MG/DL (ref 0.1–2)
BUN BLD-MCNC: 14 MG/DL (ref 7–18)
BUN/CREAT SERPL: 25.9 (ref 10–20)
CALCIUM BLD-MCNC: 8.3 MG/DL (ref 8.5–10.1)
CHLORIDE SERPL-SCNC: 107 MMOL/L (ref 98–112)
CO2 SERPL-SCNC: 28 MMOL/L (ref 21–32)
CREAT BLD-MCNC: 0.54 MG/DL (ref 0.55–1.02)
GLOBULIN PLAS-MCNC: 3.7 G/DL (ref 2.8–4.4)
GLUCOSE BLD-MCNC: 120 MG/DL (ref 70–99)
M PROTEIN MFR SERPL ELPH: 5.5 G/DL (ref 6.4–8.2)
OSMOLALITY SERPL CALC.SUM OF ELEC: 290 MOSM/KG (ref 275–295)
POTASSIUM SERPL-SCNC: 4 MMOL/L (ref 3.5–5.1)
SODIUM SERPL-SCNC: 139 MMOL/L (ref 136–145)

## 2020-02-29 RX ORDER — PSEUDOEPHEDRINE HCL 30 MG
100 TABLET ORAL 2 TIMES DAILY
Qty: 60 CAPSULE | Refills: 0 | Status: SHIPPED | OUTPATIENT
Start: 2020-02-29

## 2020-02-29 NOTE — PLAN OF CARE
Problem: Patient Centered Care  Goal: Patient preferences are identified and integrated in the patient's plan of care  Description  Interventions:  - What would you like us to know as we care for you? Patient plans to go back to [facility] for completing rehab. ADULT  Goal: Absence of fever/infection during anticipated neutropenic period  Description  INTERVENTIONS  - Monitor WBC  - Administer growth factors as ordered  - Implement neutropenic guidelines  Outcome: Adequate for Discharge     Problem: SAFETY ADULT communication ability and preferred communication style  - Implement communication aides and strategies  - Use visual cues when possible  - Listen attentively, be patient, do not interrupt  - Minimize distractions  - Allow time for understanding and respon

## 2020-02-29 NOTE — CM/SW NOTE
CM received call from [facility] liaison to inform pt able to [address] today  INSURANCE has given AUTH APPROVAL. Sherin Mcdonald Bed is available today 2/29/20 at [facility]. OMAIRA spoke with Nicol Brian RN, informed of above, stated will obtain MD dc order.

## 2020-03-01 NOTE — PAYOR COMM NOTE
--------------  DISCHARGE REVIEW    Bonnie Alvarez MA [facility]  Subscriber #:  [id number]  Authorization Number: [id number]    Admit date: 2/25/20  Admit time:  2246  Discharge Date: 2/29/2020  1:24 PM     Admitting Physician: Paolo Nunez MD  Attending Monica Tarn

## 2020-03-12 ENCOUNTER — HOSPITAL ENCOUNTER (INPATIENT)
Facility: HOSPITAL | Age: 80
LOS: 6 days | Discharge: NURSING, REHAB OR LONG-TERM CARE | DRG: 390 | End: 2020-03-18
Attending: EMERGENCY MEDICINE | Admitting: INTERNAL MEDICINE
Payer: MEDICARE

## 2020-03-12 ENCOUNTER — APPOINTMENT (OUTPATIENT)
Dept: CT IMAGING | Facility: HOSPITAL | Age: 80
DRG: 390 | End: 2020-03-12
Attending: EMERGENCY MEDICINE
Payer: MEDICARE

## 2020-03-12 DIAGNOSIS — K56.609 SMALL BOWEL OBSTRUCTION (HCC): Primary | ICD-10-CM

## 2020-03-12 LAB
ALBUMIN SERPL-MCNC: 2.5 G/DL (ref 3.4–5)
ALBUMIN/GLOB SERPL: 0.6 {RATIO} (ref 1–2)
ALP LIVER SERPL-CCNC: 229 U/L (ref 55–142)
ALT SERPL-CCNC: 9 U/L (ref 13–56)
ANION GAP SERPL CALC-SCNC: 6 MMOL/L (ref 0–18)
AST SERPL-CCNC: 23 U/L (ref 15–37)
BASOPHILS # BLD AUTO: 0.05 X10(3) UL (ref 0–0.2)
BASOPHILS NFR BLD AUTO: 0.6 %
BILIRUB SERPL-MCNC: 0.7 MG/DL (ref 0.1–2)
BUN BLD-MCNC: 17 MG/DL (ref 7–18)
BUN/CREAT SERPL: 26.6 (ref 10–20)
CALCIUM BLD-MCNC: 9.4 MG/DL (ref 8.5–10.1)
CHLORIDE SERPL-SCNC: 104 MMOL/L (ref 98–112)
CO2 SERPL-SCNC: 29 MMOL/L (ref 21–32)
CREAT BLD-MCNC: 0.64 MG/DL (ref 0.55–1.02)
DEPRECATED RDW RBC AUTO: 57.1 FL (ref 35.1–46.3)
EOSINOPHIL # BLD AUTO: 0.04 X10(3) UL (ref 0–0.7)
EOSINOPHIL NFR BLD AUTO: 0.5 %
ERYTHROCYTE [DISTWIDTH] IN BLOOD BY AUTOMATED COUNT: 16.9 % (ref 11–15)
GLOBULIN PLAS-MCNC: 4.2 G/DL (ref 2.8–4.4)
GLUCOSE BLD-MCNC: 125 MG/DL (ref 70–99)
HCT VFR BLD AUTO: 33.9 % (ref 35–48)
HGB BLD-MCNC: 11 G/DL (ref 12–16)
IMM GRANULOCYTES # BLD AUTO: 0.04 X10(3) UL (ref 0–1)
IMM GRANULOCYTES NFR BLD: 0.5 %
LYMPHOCYTES # BLD AUTO: 1.35 X10(3) UL (ref 1–4)
LYMPHOCYTES NFR BLD AUTO: 16.8 %
M PROTEIN MFR SERPL ELPH: 6.7 G/DL (ref 6.4–8.2)
MCH RBC QN AUTO: 29.9 PG (ref 26–34)
MCHC RBC AUTO-ENTMCNC: 32.4 G/DL (ref 31–37)
MCV RBC AUTO: 92.1 FL (ref 80–100)
MONOCYTES # BLD AUTO: 0.67 X10(3) UL (ref 0.1–1)
MONOCYTES NFR BLD AUTO: 8.3 %
NEUTROPHILS # BLD AUTO: 5.9 X10 (3) UL (ref 1.5–7.7)
NEUTROPHILS # BLD AUTO: 5.9 X10(3) UL (ref 1.5–7.7)
NEUTROPHILS NFR BLD AUTO: 73.3 %
OSMOLALITY SERPL CALC.SUM OF ELEC: 291 MOSM/KG (ref 275–295)
PLATELET # BLD AUTO: 357 10(3)UL (ref 150–450)
POTASSIUM SERPL-SCNC: 3.6 MMOL/L (ref 3.5–5.1)
RBC # BLD AUTO: 3.68 X10(6)UL (ref 3.8–5.3)
SODIUM SERPL-SCNC: 139 MMOL/L (ref 136–145)
WBC # BLD AUTO: 8.1 X10(3) UL (ref 4–11)

## 2020-03-12 PROCEDURE — 74177 CT ABD & PELVIS W/CONTRAST: CPT | Performed by: EMERGENCY MEDICINE

## 2020-03-12 RX ORDER — MORPHINE SULFATE 4 MG/ML
4 INJECTION, SOLUTION INTRAMUSCULAR; INTRAVENOUS ONCE
Status: COMPLETED | OUTPATIENT
Start: 2020-03-12 | End: 2020-03-12

## 2020-03-12 RX ORDER — ONDANSETRON 2 MG/ML
4 INJECTION INTRAMUSCULAR; INTRAVENOUS ONCE
Status: COMPLETED | OUTPATIENT
Start: 2020-03-12 | End: 2020-03-12

## 2020-03-12 RX ORDER — MORPHINE SULFATE 4 MG/ML
INJECTION, SOLUTION INTRAMUSCULAR; INTRAVENOUS
Status: DISPENSED
Start: 2020-03-12 | End: 2020-03-13

## 2020-03-12 RX ORDER — ONDANSETRON 2 MG/ML
4 INJECTION INTRAMUSCULAR; INTRAVENOUS EVERY 4 HOURS PRN
Status: DISCONTINUED | OUTPATIENT
Start: 2020-03-12 | End: 2020-03-18

## 2020-03-12 RX ORDER — ASPIRIN 81 MG/1
81 TABLET ORAL DAILY
Status: DISCONTINUED | OUTPATIENT
Start: 2020-03-12 | End: 2020-03-18

## 2020-03-12 RX ORDER — ONDANSETRON 2 MG/ML
4 INJECTION INTRAMUSCULAR; INTRAVENOUS EVERY 6 HOURS PRN
Status: DISCONTINUED | OUTPATIENT
Start: 2020-03-12 | End: 2020-03-12

## 2020-03-12 RX ORDER — ONDANSETRON 2 MG/ML
INJECTION INTRAMUSCULAR; INTRAVENOUS
Status: DISPENSED
Start: 2020-03-12 | End: 2020-03-13

## 2020-03-12 RX ORDER — SODIUM CHLORIDE 9 MG/ML
INJECTION, SOLUTION INTRAVENOUS CONTINUOUS
Status: ACTIVE | OUTPATIENT
Start: 2020-03-12 | End: 2020-03-12

## 2020-03-12 RX ORDER — HEPARIN SODIUM 5000 [USP'U]/ML
5000 INJECTION, SOLUTION INTRAVENOUS; SUBCUTANEOUS EVERY 8 HOURS SCHEDULED
Status: DISCONTINUED | OUTPATIENT
Start: 2020-03-12 | End: 2020-03-18

## 2020-03-12 RX ORDER — ZOLPIDEM TARTRATE 5 MG/1
5 TABLET ORAL NIGHTLY PRN
Status: DISCONTINUED | OUTPATIENT
Start: 2020-03-12 | End: 2020-03-18

## 2020-03-12 RX ORDER — ENOXAPARIN SODIUM 100 MG/ML
40 INJECTION SUBCUTANEOUS DAILY
Status: DISCONTINUED | OUTPATIENT
Start: 2020-03-12 | End: 2020-03-12

## 2020-03-12 RX ORDER — SODIUM CHLORIDE 9 MG/ML
INJECTION, SOLUTION INTRAVENOUS CONTINUOUS
Status: ACTIVE | OUTPATIENT
Start: 2020-03-12 | End: 2020-03-14

## 2020-03-12 RX ORDER — MORPHINE SULFATE 2 MG/ML
2 INJECTION, SOLUTION INTRAMUSCULAR; INTRAVENOUS EVERY 4 HOURS PRN
Status: DISCONTINUED | OUTPATIENT
Start: 2020-03-12 | End: 2020-03-18

## 2020-03-12 RX ORDER — SODIUM CHLORIDE 9 MG/ML
INJECTION, SOLUTION INTRAVENOUS CONTINUOUS
Status: DISCONTINUED | OUTPATIENT
Start: 2020-03-12 | End: 2020-03-17

## 2020-03-12 NOTE — H&P
[facility] Patient Status:  Inpatient    1940 MRN [id number]   Location [facility] 4W/SW/SE Attending Aris Fabian MD   Hosp Day # 0 PCP No primary care provider on file.      Maycol nightly., Disp: , Rfl: , 3/12/2020 at Unknown time  Calcium Carbonate Antacid 500 MG Oral Chew Tab, Chew 1 tablet by mouth every 4 (four) hours as needed for Heartburn., Disp: , Rfl: , Past Week at Unknown time  acetaminophen 500 MG Oral Tab, Take 500 mg b bilaterally, No wheezes, crackles  Heart: Regular rate and rhythm, S1 and S2 normal, no murmur, rub or gallop  Abdomen: Soft, ++tender, non distended, Bs+, no organomegaly   Extremities: No cyanosis, edema   Pulses: 2+ and symmetric all extremities  Neurol discharge, patient will require TBD.       Brittany Venegas  3/12/2020  6:52 PM

## 2020-03-12 NOTE — PLAN OF CARE
RECEIVED PATIENT FROM ED . ADMISSION COMPLETED- OBTAINED HISTORY FROM PATIENT AND PATIENT'S DAUGHTER.   PATIENT REQUESTED Trupti PLACED/ ORDERS FROM ED FOR IV FLUIDS AND NPO-  IV FLUIDS /HR STARTED- MAINTAINING NPO/ VANESSA ROCK NOTIFIED OF

## 2020-03-13 LAB
BACTERIA UR QL AUTO: NEGATIVE /HPF
BILIRUB UR QL: NEGATIVE
CLARITY UR: CLEAR
COLOR UR: YELLOW
GLUCOSE UR-MCNC: NEGATIVE MG/DL
KETONES UR-MCNC: NEGATIVE MG/DL
LEUKOCYTE ESTERASE UR QL STRIP.AUTO: NEGATIVE
NITRITE UR QL STRIP.AUTO: NEGATIVE
PH UR: 7 [PH] (ref 5–8)
PROT UR-MCNC: NEGATIVE MG/DL
RBC #/AREA URNS AUTO: 18 /HPF
UROBILINOGEN UR STRIP-ACNC: 4
WBC #/AREA URNS AUTO: 7 /HPF

## 2020-03-13 PROCEDURE — 99222 1ST HOSP IP/OBS MODERATE 55: CPT | Performed by: INTERNAL MEDICINE

## 2020-03-13 RX ORDER — ACETAMINOPHEN 325 MG/1
650 TABLET ORAL EVERY 6 HOURS PRN
Status: DISCONTINUED | OUTPATIENT
Start: 2020-03-13 | End: 2020-03-18

## 2020-03-13 RX ORDER — FLUCONAZOLE 200 MG/1
200 TABLET ORAL DAILY
Status: DISCONTINUED | OUTPATIENT
Start: 2020-03-13 | End: 2020-03-14

## 2020-03-13 NOTE — CM/SW NOTE
The pt. Was admitted from [facility] where she was for rehab. The pt. Was recently discharged from [address] [place] [address] about 2 weeks ago to rehab. Plan at this time is to return to [facility] to complete her rehab stay. PT/OT evals are pending.     Insurance Bonnie Lamarmaury will be

## 2020-03-13 NOTE — ED PROVIDER NOTES
Patient Seen in: [facility] 4w/sw/se      History   Patient presents with:  Abdomen/Flank Pain    Stated Complaint: abd pain    HPI    The patient is a 77-year-old female with a history of femur fracture approximately 1 month ago and status post b Conjunctiva/sclera: Conjunctivae normal.      Pupils: Pupils are equal, round, and reactive to light. Neck:      Musculoskeletal: Normal range of motion and neck supple. Vascular: No JVD.    Cardiovascular:      Rate and Rhythm: Normal rate and regul Abnormal            Final result                 Please view results for these tests on the individual orders.    URINALYSIS WITH CULTURE REFLEX   RAINBOW DRAW BLUE   RAINBOW DRAW LAVENDER   RAINBOW DRAW LIGHT GREEN   RAINBOW DRAW GOLD          Case discuss PM                   Disposition and Plan     Clinical Impression:  Small bowel obstruction ([facility] [address]  (primary encounter diagnosis)    Disposition:  Admit  3/12/2020  4:28 pm    Follow-up:  No follow-up provider specified.   We recommend that you schedule foll

## 2020-03-13 NOTE — OCCUPATIONAL THERAPY NOTE
OCCUPATIONAL THERAPY EVALUATION - INPATIENT     Room Number: 440/440-A  Evaluation Date: 3/13/2020  Type of Evaluation: Initial  Presenting Problem: SBO ; hx recent fall s/p ORIF LLE NWB    Physician Order: IP Consult to Occupational Therapy  Reason for Th verbal cues to continue to pivot over to chair as she was attempting to prematurely sit.   Pt's upper extremity strength and range of motion are [facility] for oral facial hygiene, upper extremity dressing, and self- feeding in supported sitting position provided commands consistently  Decreased safety awareness intermittently   Overall WFL    Communication: makes needs known     Behavioral/Emotional/Social: pleasant and cooperative     RANGE OF MOTION   Upper extremity ROM is within functional limits     STRENGTH Goals  on: 3/20/2020  Frequency: 3x/wk

## 2020-03-13 NOTE — PHYSICAL THERAPY NOTE
PHYSICAL THERAPY EVALUATION - INPATIENT     Room Number: 440/440-A  Evaluation Date: 3/13/2020  Type of Evaluation: Initial   Physician Order: PT Eval and Treat    Presenting Problem: SBO  Reason for Therapy: Mobility Dysfunction and Discharge Planning in the [facility] '6 clicks' Inpatient Basic Mobility Short Form.   Research supports that patients with this level of impairment may benefit from return to sub-acute rehab    Patient will benefit from continued IP PT services to address these deficits in prepar MOTION AND STRENGTH ASSESSMENT  Upper extremity ROM and strength are within functional limits\    Lower extremity ROM is impaired LLE- formal testing deferred due to knee immobilizer placement and recent procedure     Lower extremity strength is impaired L walker - rolling     Goal #2  Current Status    Goal #3 Patient is able to ambulate 5 feet with assist device: walker - rolling at assistance level: minimum assistance while maintaining compliance toward NWB LLE   Goal #3   Current Status    Goal #4 Dustin

## 2020-03-13 NOTE — PLAN OF CARE
Patient is alert and oriented. Pain managed with Morphine PRN. Emesis occurrence x1 Overnight, zofran administered and frequency of dose increased. IVF continued. Alicja Mei in place for incontinence. UA w/ culture sent this AM, results pending.  Large BM no request assistance  - Assess pain using appropriate pain scale  - Administer analgesics based on type and severity of pain and evaluate response  - Implement non-pharmacological measures as appropriate and evaluate response  - Consider cultural and social POLST form as appropriate  - Assess patient's ability to be responsible for managing their own health  - Refer to Case Management Department for coordinating discharge planning if the patient needs post-hospital services based on physician/LIP order or com moisture  - Encourage food from home; allow for food preferences  - Enhance eating environment  3/13/2020 0129 by Beverly Hall RN  Outcome: Progressing  3/13/2020 0126 by Beverly Hall RN  Outcome: Progressing     Problem: SKIN/TISSUE INTEGRITY - AD Promote increasing activity/tolerance for mobility and gait  - Educate and engage patient/family in tolerated activity level and precautions  - Recommend patient transfer to bedside chair toward strongest side  3/13/2020 0129 by Dian Myers RN  Outcom

## 2020-03-13 NOTE — CONSULTS
[facility]    Report of Consultation    Douglas Arreola Patient Status:  Inpatient    1940 MRN [id number]   Location [facility] 4W/SW/SE Attending Josefina Boone MD   [facility] Day # 1 PCP No primary care provider on file. mg by mouth 2 (two) times daily. , Disp: 60 capsule, Rfl: 0, 3/12/2020 at Unknown time  Enoxaparin Sodium 40 MG/0.4ML Subcutaneous Solution, Inject 40 mg into the skin daily. , Disp: , Rfl: , 3/12/2020 at Unknown time  [place] Hydroxide-Simeth [phone] cervical spine. No JVD. Supple. Lungs: per ER  Cardiac: per ER  Abdomen:  Soft, mild distention lower quadrants    Mild tenderness at sites   Extremities:  Splint/cast left ler    Skin: Normal texture and turgor.     Laboratory Data:  Lab Results   [place] the areas indicated in the order with gray scale and colorflow of the main vessels where appropriate. Areas scanned:  Right upper quadrant. FINDINGS:  LIVER:   Normal.  Normal size, echotexture and color flow. No masses or duct dilatation.   GALLBLADDER: BILIARY: Pneumobilia. Intrahepatic and extrahepatic biliary ductal dilation with the common bile duct measuring 1.8 cm diameter. Common bile duct stent. Cholecystectomy. PANCREAS: No lesion, fluid collection, ductal dilatation, or atrophy.   SPLEEN: No e Mild peripheral enhancement surrounding the common bile duct, favored to reflect postprocedural change; less likely coexistent cholangitis. Please correlate with laboratory parameters. 3. Trace mesenteric free fluid. Trace perihepatic ascites.  4. Cholecy coronal images. The common duct measures 2.0 cm proximal to this level  and 2.3 cm distal to this level with further gradual tapering of the common duct more distally at the ampulla.   There is amorphous layering T2 isointensity/T1 hyperintensity along the stricture in the proximal common duct without worsening of upstream biliary dilatation, which favors a benign stricture.   Amorphous layering filling defects are noted in the proximal common duct along the proximal margin of the stricture with a similar silvina may need surgical intervention    [address] [facility]  3/13/2020  8:29 AM

## 2020-03-13 NOTE — PAYOR COMM NOTE
--------------  ADMISSION REVIEW     Tate Glass MA [facility]  Subscriber #:  [id number]  Authorization Number: [id number]    Admit date: 3/12/20  Admit time: 1       Admitting Physician: Millicent Martines MD  Attending Physician:  Millicent Martines MD  Primary Car Phosphatase 229 (*)     Albumin 2.5 (*)     A/G Ratio 0.6 (*)     All other components within normal limits   CBC W/ DIFFERENTIAL - Abnormal; Notable for the following components:    RBC 3.68 (*)     HGB 11.0 (*)     HCT 33.9 (*)     RDW-SD 57.1 (*)     RD stool  Had hx of open cholecystectomy many years ago  And recent common duct stent for stenosis  Also left hip fx  Unknown blood thinner  (lovenox?) for prophylaxis    Physical Exam:  Blood pressure 108/57, pulse 89, temperature 98.3 °F (36.8 °C), temperat associated nausea, (+) vomiting in the hospital after initial IV analgesics administered. No hematemesis. She had a small BM 3/11, then difficulty passing BM/gas yesterday, (+) large brown muddy BM this AM without reported hematochezia/melena.     Assessmen been sent via MyClasses.       CHARU Patel ext [phone]    H&P Note    No notes of this type exist for this encounter.          MEDICATIONS ADMINISTERED IN LAST 1 DAY:  acetaminophen (TYLENOL) tab 650 mg     Date Action Dose Route User    3/ Route User    3/12/2020 2033 New Bag (none) Intravenous Verónica Murphy, RN      sodium chloride 0.9% IV bolus 500 mL     Date Action Dose Route User    3/12/2020 1502 New Bag 500 mL Intravenous Zo Chirinos, MICHELLE      Zolpidem Tartrate (AMBIEN) tab 5 mg

## 2020-03-13 NOTE — CONSULTS
[facility]   G[facility]ology Consultation Note    Dick Howe  Patient Status:    Inpatient  Date of Admission:         3/12/2020, Hospital day #1  Attending:   Nawaf Davis MD  PCP:     No primary care provider on file. ENDOSCOPIC RETROGRADE CHOLANGIOPANCREATOGRAPHY (ERCP) N/A 2/26/2020    Performed by Herman Rodriguez MD at [address] [place] [address] ENDOSCOPY   • FRACTURE SURGERY     • REMOVAL GALLBLADDER       History reviewed. No pertinent family history.    reports that she has quit smok conjunctiva pink, the sclera appears anicteric  CV: RRR  Lung: moves air well; no labored breathing  Abdomen: soft, non-distended, non-tender abdomen with +BS  Skin: no jaundice  Ext: no LE edema is evident.    Neuro: Alert and interactive  Psych: calm,  year old female patient with PMHx of cholceystectomy, leg fracture, reflux and diverticulitis known to our GI team from 2/25/2020 hospitalization wherein GI evaluation requested for abd pain and abnormal LFTs.  The patient underwent MRI/MRCP at that time 2/

## 2020-03-13 NOTE — PROGRESS NOTES
[facility]    Progress Note    Ashley Ayala Patient Status:  Inpatient    1940 MRN [id number]   Location [facility] 4W/SW/SE Attending Nikki Guthrie MD   [facility] Day # 1 PCP No primary care provider on file.      Randolph Mcconnell now           Plan:     NPO  IVF  Anti- emetic  Pain control  Surgery consulted in ED- recommended for conservative management for now.   GI consulted in view of recent biliary stent placement, for possible stent exchange/removal.  Med rec done  DVT/GI ppx

## 2020-03-13 NOTE — PLAN OF CARE
Problem: Patient Centered Care  Goal: Patient preferences are identified and integrated in the patient's plan of care  Description  Interventions:  - What would you like us to know as we care for you?   - Provide timely, complete, and accurate informatio injury  Description  INTERVENTIONS:  - Assess pt frequently for physical needs  - Identify cognitive and physical deficits and behaviors that affect risk of falls.   - [place] fall precautions as indicated by assessment.  - Educate pt/family on patient sa balance  Outcome: Progressing  Goal: Maintains or returns to baseline bowel function  Description  INTERVENTIONS:  - Assess bowel function  - Maintain adequate hydration with IV or PO as ordered and tolerated  - Evaluate effectiveness of GI medications  - Support and protect limb and body alignment per provider's orders  - Instruct and reinforce with patient and family use of appropriate assistive device and precautions (e.g. spinal or hip dislocation precautions)  Outcome: Progressing     Problem: Impaired

## 2020-03-14 ENCOUNTER — APPOINTMENT (OUTPATIENT)
Dept: GENERAL RADIOLOGY | Facility: HOSPITAL | Age: 80
DRG: 390 | End: 2020-03-14
Attending: SPECIALIST
Payer: MEDICARE

## 2020-03-14 LAB
ANION GAP SERPL CALC-SCNC: 4 MMOL/L (ref 0–18)
BASOPHILS # BLD AUTO: 0.04 X10(3) UL (ref 0–0.2)
BASOPHILS NFR BLD AUTO: 1.1 %
BUN BLD-MCNC: 8 MG/DL (ref 7–18)
BUN/CREAT SERPL: 16.7 (ref 10–20)
C DIFF TOX B STL QL: NEGATIVE
CALCIUM BLD-MCNC: 8.6 MG/DL (ref 8.5–10.1)
CHLORIDE SERPL-SCNC: 112 MMOL/L (ref 98–112)
CO2 SERPL-SCNC: 27 MMOL/L (ref 21–32)
CREAT BLD-MCNC: 0.48 MG/DL (ref 0.55–1.02)
DEPRECATED RDW RBC AUTO: 56.2 FL (ref 35.1–46.3)
EOSINOPHIL # BLD AUTO: 0.13 X10(3) UL (ref 0–0.7)
EOSINOPHIL NFR BLD AUTO: 3.5 %
ERYTHROCYTE [DISTWIDTH] IN BLOOD BY AUTOMATED COUNT: 16.4 % (ref 11–15)
GLUCOSE BLD-MCNC: 86 MG/DL (ref 70–99)
HAV IGM SER QL: 1.9 MG/DL (ref 1.6–2.6)
HCT VFR BLD AUTO: 29 % (ref 35–48)
HGB BLD-MCNC: 9.1 G/DL (ref 12–16)
IMM GRANULOCYTES # BLD AUTO: 0.01 X10(3) UL (ref 0–1)
IMM GRANULOCYTES NFR BLD: 0.3 %
LYMPHOCYTES # BLD AUTO: 1.24 X10(3) UL (ref 1–4)
LYMPHOCYTES NFR BLD AUTO: 33.5 %
MCH RBC QN AUTO: 29.6 PG (ref 26–34)
MCHC RBC AUTO-ENTMCNC: 31.4 G/DL (ref 31–37)
MCV RBC AUTO: 94.5 FL (ref 80–100)
MONOCYTES # BLD AUTO: 0.4 X10(3) UL (ref 0.1–1)
MONOCYTES NFR BLD AUTO: 10.8 %
NEUTROPHILS # BLD AUTO: 1.88 X10 (3) UL (ref 1.5–7.7)
NEUTROPHILS # BLD AUTO: 1.88 X10(3) UL (ref 1.5–7.7)
NEUTROPHILS NFR BLD AUTO: 50.8 %
OSMOLALITY SERPL CALC.SUM OF ELEC: 294 MOSM/KG (ref 275–295)
PLATELET # BLD AUTO: 274 10(3)UL (ref 150–450)
POTASSIUM SERPL-SCNC: 3.8 MMOL/L (ref 3.5–5.1)
RBC # BLD AUTO: 3.07 X10(6)UL (ref 3.8–5.3)
SODIUM SERPL-SCNC: 143 MMOL/L (ref 136–145)
WBC # BLD AUTO: 3.7 X10(3) UL (ref 4–11)

## 2020-03-14 PROCEDURE — 74018 RADEX ABDOMEN 1 VIEW: CPT | Performed by: SPECIALIST

## 2020-03-14 RX ORDER — POTASSIUM CHLORIDE 20 MEQ/1
40 TABLET, EXTENDED RELEASE ORAL ONCE
Status: COMPLETED | OUTPATIENT
Start: 2020-03-14 | End: 2020-03-14

## 2020-03-14 RX ORDER — MAGNESIUM CARB/ALUMINUM HYDROX 105-160MG
15 TABLET,CHEWABLE ORAL ONCE
Status: COMPLETED | OUTPATIENT
Start: 2020-03-14 | End: 2020-03-14

## 2020-03-14 NOTE — PROGRESS NOTES
[facility]    Progress Note    Hi Davis Patient Status:  Inpatient    1940 MRN [id number]   Location [facility] 4W/SW/SE Attending Brett Aase, MD   [facility] Day # 2 PCP No primary care provider on file.      [place] magnetic resonance cholangiopancreatography (MRCP)     Elevated liver enzymes     RUQ pain     Small bowel obstruction (HCC)     # SBO, most likely from adhesion, partial - improved.  Having multiple BM  # Recent biliary stent placement due to biliary stric

## 2020-03-14 NOTE — PLAN OF CARE
Problem: Patient Centered Care  Goal: Patient preferences are identified and integrated in the patient's plan of care  Description  Interventions:  - What would you like us to know as we care for you?   - Provide timely, complete, and accurate informatio injury  Description  INTERVENTIONS:  - Assess pt frequently for physical needs  - Identify cognitive and physical deficits and behaviors that affect risk of falls.   - [place] fall precautions as indicated by assessment.  - Educate pt/family on patient sa balance  Outcome: Progressing  Goal: Maintains or returns to baseline bowel function  Description  INTERVENTIONS:  - Assess bowel function  - Maintain adequate hydration with IV or PO as ordered and tolerated  - Evaluate effectiveness of GI medications  - Support and protect limb and body alignment per provider's orders  - Instruct and reinforce with patient and family use of appropriate assistive device and precautions (e.g. spinal or hip dislocation precautions)  Outcome: Progressing     Problem: Impaired

## 2020-03-14 NOTE — PROGRESS NOTES
[facility]    Progress Note    Ashlee Soto Patient Status:  Inpatient    1940 MRN [id number]   Location [facility] 4W/SW/SE Attending Mikki Mills MD   [facility] Day # 2 PCP No primary care provider on file.      Subjec ERCP. Please see final operative report for further details.    Dictated by (CST): Carissa Short MD on 2/26/2020 at 20:52     Approved by (CST): Carissa Short MD on 2/26/2020 at 20:55          Us Abdomen Limited (cpt=76705)    Result Date: 2/25/2020  [facility] reduction was used. Adjustment of the mA and/or kV was done based on the patient's size. Iterative reconstruction technique for dose reduction was employed. FINDINGS: LUNG BASES: The heart is normal in size.  There is dependent subsegmental atelectasis bandar potentially related to adhesion formation. No definite pneumatosis intestinalis, free intraperitoneal air, or drainable intra-abdominal fluid collection. 2. Interval placement of a common bile duct stent.   Moderate to severe persistent intrahepatic and ex and of the abdominal viscera for the presence of intraparenchymal pathology is suboptimal in the absence of contrast infusion. GALLBLADDER:   Again noted to be surgically absent.  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 OTHER: No loculated fluid collections are appreciated.          CONCLUSION:  Moderate extrahepatic and intrahepatic biliary ductal dilatation again noted, probably relating to a cholangitis with obstruction     Anemia, unspecified type     Abnormal magnetic resonance cholangiopancreatography (MRCP)     Elevated liver enzymes     RUQ pain     Small bowel obstruction (HCC)  prelim KUB   No obstruction    Appears to be resolving psbo

## 2020-03-14 NOTE — PLAN OF CARE
Plan of care reviewed with [place]. Patient denies abdominal pain and has had multiple loose bowel movements. Stool specimen sent for C-diff, which was negative. Patient continues to be incontinent. Purewick changed frequently due to loose stools.  [facility] influences on pain and pain management  - Manage/alleviate anxiety  - Utilize distraction and/or relaxation techniques  - Monitor for opioid side effects  - Notify MD/LIP if interventions unsuccessful or patient reports new pain  - Anticipate increased prashant vomiting  Description  INTERVENTIONS:  - Maintain adequate hydration with IV or PO as ordered and tolerated  - Nasogastric tube to low intermittent suction as ordered  - Evaluate effectiveness of ordered antiemetic medications  - Provide nonpharmacologic c and ambulation using safe patient handling equipment as needed  - Ensure adequate protection for wounds/incisions during mobilization  - Obtain PT/OT consults as needed  - Advance activity as appropriate  - Communicate ordered activity level and limitation

## 2020-03-15 ENCOUNTER — APPOINTMENT (OUTPATIENT)
Dept: GENERAL RADIOLOGY | Facility: HOSPITAL | Age: 80
DRG: 390 | End: 2020-03-15
Attending: SPECIALIST
Payer: MEDICARE

## 2020-03-15 LAB
BASOPHILS # BLD AUTO: 0.03 X10(3) UL (ref 0–0.2)
BASOPHILS NFR BLD AUTO: 0.8 %
DEPRECATED RDW RBC AUTO: 54.6 FL (ref 35.1–46.3)
EOSINOPHIL # BLD AUTO: 0.1 X10(3) UL (ref 0–0.7)
EOSINOPHIL NFR BLD AUTO: 2.6 %
ERYTHROCYTE [DISTWIDTH] IN BLOOD BY AUTOMATED COUNT: 16 % (ref 11–15)
HCT VFR BLD AUTO: 27.6 % (ref 35–48)
HGB BLD-MCNC: 8.9 G/DL (ref 12–16)
IMM GRANULOCYTES # BLD AUTO: 0.01 X10(3) UL (ref 0–1)
IMM GRANULOCYTES NFR BLD: 0.3 %
LYMPHOCYTES # BLD AUTO: 1.16 X10(3) UL (ref 1–4)
LYMPHOCYTES NFR BLD AUTO: 29.9 %
MCH RBC QN AUTO: 30 PG (ref 26–34)
MCHC RBC AUTO-ENTMCNC: 32.2 G/DL (ref 31–37)
MCV RBC AUTO: 92.9 FL (ref 80–100)
MONOCYTES # BLD AUTO: 0.41 X10(3) UL (ref 0.1–1)
MONOCYTES NFR BLD AUTO: 10.6 %
NEUTROPHILS # BLD AUTO: 2.17 X10 (3) UL (ref 1.5–7.7)
NEUTROPHILS # BLD AUTO: 2.17 X10(3) UL (ref 1.5–7.7)
NEUTROPHILS NFR BLD AUTO: 55.8 %
PLATELET # BLD AUTO: 270 10(3)UL (ref 150–450)
POTASSIUM SERPL-SCNC: 3.6 MMOL/L (ref 3.5–5.1)
RBC # BLD AUTO: 2.97 X10(6)UL (ref 3.8–5.3)
WBC # BLD AUTO: 3.9 X10(3) UL (ref 4–11)

## 2020-03-15 PROCEDURE — 74018 RADEX ABDOMEN 1 VIEW: CPT | Performed by: SPECIALIST

## 2020-03-15 PROCEDURE — 99232 SBSQ HOSP IP/OBS MODERATE 35: CPT | Performed by: INTERNAL MEDICINE

## 2020-03-15 RX ORDER — POTASSIUM CHLORIDE 20 MEQ/1
40 TABLET, EXTENDED RELEASE ORAL EVERY 4 HOURS
Status: COMPLETED | OUTPATIENT
Start: 2020-03-15 | End: 2020-03-15

## 2020-03-15 NOTE — PLAN OF CARE
Liliya is aware of POC at this time. She is having smear Bms. She is tolerating a full liquid diet. Immobilizer in place. No complaints of pain at this time. Pt to go for pt to be NPO at [facility] for stent placement 3/16.      Plan to d/c to and/or relaxation techniques  - Monitor for opioid side effects  - Notify MD/LIP if interventions unsuccessful or patient reports new pain  - Anticipate increased pain with activity and pre-medicate as appropriate  Outcome: Progressing     Problem: SAFETY Nasogastric tube to low intermittent suction as ordered  - Evaluate effectiveness of ordered antiemetic medications  - Provide nonpharmacologic comfort measures as appropriate  - Advance diet as tolerated, if ordered  - Obtain nutritional consult as needed wounds/incisions during mobilization  - Obtain PT/OT consults as needed  - Advance activity as appropriate  - Communicate ordered activity level and limitations with patient/family  Outcome: Progressing  Goal: Maintain proper alignment of affected body par

## 2020-03-15 NOTE — PLAN OF CARE
Problem: Patient/Family Goals  Goal: Patient/Family Long Term Goal  Description  Patient's Long Term Goal: Patient will return home tolerating PO    Interventions:  - PRN meds  - Slowly advance diet  - PT/OT  - See additional Care Plan goals for specific Problem: DISCHARGE PLANNING  Goal: Discharge to home or other facility with appropriate resources  Description  INTERVENTIONS:  - Identify barriers to discharge w/pt and caregiver  - Include patient/family/discharge partner in discharge Wicho Munroe each meal consumed  - Identify factors contributing to decreased intake, treat as appropriate  - Assist with meals as needed  - Monitor I&O, WT and lab values  - Obtain nutritional consult as needed  - Optimize oral hygiene and moisture  - Encourage food f VS stable. Pt is still currently having small bowel movements. Immobilizer in place to left lower leg. IVF continued. Safety precautions maintained, bed alarm activated. Call light within reach. Will continue to monitor.

## 2020-03-15 NOTE — PROGRESS NOTES
[facility]     Gastroenterology Progress Note    Checo Lopez Patient Status:  Inpatient    1940 MRN [id number]   Location [facility] 4W/SW/SE Attending Finn Anaya MD   Hosp Day # 3 PCP No primary care prov 3/15/2020  CONCLUSION:  1. Nonspecific-nonobstructive bowel gas pattern. 2.  Stable position of common bile duct stent. 3.  Postprocedural changes in the pelvis and prior left femur ORIF.     Dictated by (CST): Janes Valentine MD on 3/15/2020 at 10:12 AM

## 2020-03-16 LAB
BASOPHILS # BLD AUTO: 0.04 X10(3) UL (ref 0–0.2)
BASOPHILS NFR BLD AUTO: 1.1 %
DEPRECATED RDW RBC AUTO: 55 FL (ref 35.1–46.3)
EOSINOPHIL # BLD AUTO: 0.23 X10(3) UL (ref 0–0.7)
EOSINOPHIL NFR BLD AUTO: 6.5 %
ERYTHROCYTE [DISTWIDTH] IN BLOOD BY AUTOMATED COUNT: 16.4 % (ref 11–15)
HCT VFR BLD AUTO: 29.7 % (ref 35–48)
HGB BLD-MCNC: 9.7 G/DL (ref 12–16)
IMM GRANULOCYTES # BLD AUTO: 0.01 X10(3) UL (ref 0–1)
IMM GRANULOCYTES NFR BLD: 0.3 %
LYMPHOCYTES # BLD AUTO: 1.09 X10(3) UL (ref 1–4)
LYMPHOCYTES NFR BLD AUTO: 31 %
MCH RBC QN AUTO: 30 PG (ref 26–34)
MCHC RBC AUTO-ENTMCNC: 32.7 G/DL (ref 31–37)
MCV RBC AUTO: 92 FL (ref 80–100)
MONOCYTES # BLD AUTO: 0.52 X10(3) UL (ref 0.1–1)
MONOCYTES NFR BLD AUTO: 14.8 %
NEUTROPHILS # BLD AUTO: 1.63 X10 (3) UL (ref 1.5–7.7)
NEUTROPHILS # BLD AUTO: 1.63 X10(3) UL (ref 1.5–7.7)
NEUTROPHILS NFR BLD AUTO: 46.3 %
PLATELET # BLD AUTO: 289 10(3)UL (ref 150–450)
RBC # BLD AUTO: 3.23 X10(6)UL (ref 3.8–5.3)
WBC # BLD AUTO: 3.5 X10(3) UL (ref 4–11)

## 2020-03-16 NOTE — PLAN OF CARE
Problem: Patient Centered Care  Goal: Patient preferences are identified and integrated in the patient's plan of care  Description  Interventions:  - What would you like us to know as we care for you?   - Provide timely, complete, and accurate informatio injury  Description  INTERVENTIONS:  - Assess pt frequently for physical needs  - Identify cognitive and physical deficits and behaviors that affect risk of falls.   - [place] fall precautions as indicated by assessment.  - Educate pt/family on patient sa balance  Outcome: Progressing  Goal: Maintains or returns to baseline bowel function  Description  INTERVENTIONS:  - Assess bowel function  - Maintain adequate hydration with IV or PO as ordered and tolerated  - Evaluate effectiveness of GI medications  - Support and protect limb and body alignment per provider's orders  - Instruct and reinforce with patient and family use of appropriate assistive device and precautions (e.g. spinal or hip dislocation precautions)  Outcome: Progressing     Problem: Impaired

## 2020-03-16 NOTE — CM/SW NOTE
CM received call from pt's daughter Sahil Jones she states she does not want her mom to return to [facility]. Daughter is looking into facilities and will contact CE/OMAIRA team.   Sidney Zuleta contacting Paige from [facility] regarding above. Michael Minors.  Carine Kruse RN, B

## 2020-03-16 NOTE — PAYOR COMM NOTE
--------------  CONTINUED STAY REVIEW    Minal Culp MA O  Subscriber #:  [id number]  Authorization Number: [id number]    Admit date: 3/12/20  Admit time: 7018      3/13:    INT MED:  Abdominal pain somewhat better today.   No reported nausea, vomiting 03/14/2020     HCT 29.0 (L) 03/14/2020     .0 03/14/2020     CREATSERUM 0.48 (L) 03/14/2020     BUN 8 03/14/2020      03/14/2020     K 3.8 03/14/2020      03/14/2020     CO2 27.0 03/14/2020     GLU 86 03/14/2020     CA 8.6 03/14/2020 (TYLENOL) tab 650 mg     Date Action Dose Route User    3/16/2020 0637 Given 650 mg Oral Arielle Poot, RN    3/15/2020 2238 Given 650 mg Oral Arielle Poot, RN    3/15/2020 1615 Given 650 mg Oral Luba Donahue, RN      aspirin EC tab 81 mg

## 2020-03-16 NOTE — PLAN OF CARE
Pt alert and oriented x4. VSS. On room air. Tolerating diet, no nausea, has had multiple soft Bms today. Voiding using purewick. Complained of abdominal pain, refused pain medications, hot packs given. IV fluids as prescribed.  Stent removal to be deferred evaluate response  - Implement non-pharmacological measures as appropriate and evaluate response  - Consider cultural and social influences on pain and pain management  - Manage/alleviate anxiety  - Utilize distraction and/or relaxation techniques  - Monit support system  Outcome: Progressing     Problem: GASTROINTESTINAL - ADULT  Goal: Minimal or absence of nausea and vomiting  Description  INTERVENTIONS:  - Maintain adequate hydration with IV or PO as ordered and tolerated  - Nasogastric tube to low interm activity tolerance for standing, transferring and ambulating w/ or w/o assistive devices  - Assist with transfers and ambulation using safe patient handling equipment as needed  - Ensure adequate protection for wounds/incisions during mobilization  - Mandi Saavedra

## 2020-03-16 NOTE — PROGRESS NOTES
[facility]     Gastroenterology Progress Note    Sherry Jones Patient Status:  Inpatient    1940 MRN [id number]   Location [facility] 4W/SW/SE Attending Rubina Ford MD   Hosp Day # 4 PCP No primary care prov #SBO: medical management - surgery following; plans per Dr. Denise Mortensen     # Biliary Stent: discussed both with Dr. Low Oliver and the patient at length - there is the possibility that SpyGlass will be necessary for this procedure, which was not available t (CST): Janes Valentine MD on 3/15/2020 at 10:13 AM

## 2020-03-16 NOTE — PROGRESS NOTES
[facility]    Progress Note    Napoleon Alba Patient Status:  Inpatient    1940 MRN [id number]   Location [facility] 4W/SW/SE Attending Nereyda Mitchell MD   [facility] Day # 4 PCP No primary care provider on file.      Matt Jensen conservative management for now. GI consulted in view of recent biliary stent placement, for possible stent exchange/removal. Pt was scheduled for stent removal but now admitted with SBO. Requesting stent removal while in hospital now.   GI to decide about

## 2020-03-16 NOTE — OCCUPATIONAL THERAPY NOTE
OCCUPATIONAL THERAPY TREATMENT NOTE - INPATIENT        Room Number: 440/440-A           Presenting Problem: SBO ; hx recent fall s/p ORIF LLE NWB    Problem List  Principal Problem:    Small bowel obstruction (HCC)  Active Problems:    Encounter for remova activities; Energy conservation/work simplification techniques;ADL training;Functional transfer training; Endurance training;Equipment eval/education; Compensatory technique education    SUBJECTIVE  Agreeable to activity     OBJECTIVE  Precautions: Limb alert Comment: NT    Patient will tolerate standing for 3 minutes in prep for adls with SBA   Comment: progress    Patient will complete LE dressing with SBA  Comment: Min A to don underwear in supine             Goals  on: 3/20/2020  Frequency: 3x/wk

## 2020-03-16 NOTE — PROGRESS NOTES
[place] [facility] - [facility]    Progress Note    Mary Mclean Patient Status:  Inpatient    1940 MRN [id number]   Location [facility] 4W/SW/SE Attending Abi De Jesus MD   [address] Day # 4 PCP No primary care provider on file.      Subjec abnormal gaseous collections. CONCLUSION:  1. Nonspecific-nonobstructive bowel gas pattern. 2.  Stable position of common bile duct stent. 3.  Postprocedural changes in the pelvis and prior left femur ORIF.     Dictated by (CST): Sonny Felton MD 2/26/2020, 12:18. INDICATIONS: ERCP with sphincterotomy, balloon sweeping, and biliary stent placement under fluoroscopy. TECHNIQUE: There was no radiologist present during the procedure.    FLUOROSCOPY EXPOSURE TIME:   281.8 seconds # OF FLUOROGRAPHIC IM (CST): Lisandro Stephen MD ON 2/25/2020 AT 18:52             Ct Abdomen+pelvis(contrast Only)(cpt=74177)    Result Date: 3/12/2020  PROCEDURE: CT ABDOMEN + PELVIS (CONTRAST ONLY) (CPT=74177)  COMPARISON: [facility], MRI ABDOMEN&MRCP W/3D (CPT=7 patient age. VASCULATURE:   No aneurysm is detected. RETROPERITONEUM: No mass or lymphadenopathy is apparent. BONES:   Demineralization with lumbar dextroscoliosis and multilevel thoracolumbar spine degenerative change.   Partially imaged proximal left fe comprehensive examination was performed utilizing a variety of imaging planes and imaging parameters to optimize visualization of suspected pathology. Images were obtained without the infusion of intravenous gadolinium-based contrast agent.    Magnetic reso BASES: No significant signal abnormality is identified. LIVER: No focal hepatic mass is seen. SPLEEN: No enlargement. ADRENALS: Unremarkable, without focal mass or enlargement. KIDNEYS: No hydronephrosis or solid masses are apparent.   A few T2 hyperint vasculature. MEDIAST/DAVID:   No visible mass or adenopathy. LUNGS/PLEURA: Mild bibasilar scarring/atelectasis. .  No significant pulmonary parenchymal abnormalities. No effusion or pleural thickening. BONES: Mild thoracolumbar scoliosis. OTHER: Negative.

## 2020-03-16 NOTE — PROGRESS NOTES
[place] [facility] - [facility]    Progress Note    Lisa Yolaye Patient Status:  Inpatient    1940 MRN [id number]   Location [facility] 4W/SW/SE Attending Nehemias Berry MD   [address] Day # 4 PCP No primary care provider on file.      Jerome Almaguer stricture  # Left femur fracture, non weight bearing now  # Diarrhea- improved           Plan:     Full liquid diet  IVF  Anti- emetic  Pain control  Surgery consulted in ED- recommended for conservative management for now.   GI consulted in view of recent

## 2020-03-16 NOTE — CM/SW NOTE
Updated information has been sent to [facility] via [employer]. Insurance [employer] is needed prior to discharge. Insurance [employer] is pending at this time.       Ady Delaney [facility] ext [phone]

## 2020-03-16 NOTE — PHYSICAL THERAPY NOTE
PHYSICAL THERAPY TREATMENT NOTE - INPATIENT     Room Number: 440/440-A       Presenting Problem: SBO    Problem List  Principal Problem:    Small bowel obstruction ([address]  Active Problems:    Encounter for removal of biliary stent    Diarrhea      PHYSICAL Denies any pain   Reports recently increase diarrhea with bottom area soreness     Pt reports at rehab was instructed in quad sets and demonstrated these for therapist     OBJECTIVE  Precautions: Knee immobilizer;Limb alert - left;Bed/chair alarm    WEIGHT concerns addressed    CURRENT GOALS     CURRENT GOALS     Goals to be met by: 3/20/2020  Patient Goal Patient's self-stated goal is: return to rehab   Goal #1 Patient is able to demonstrate supine - sit EOB @ level: supervision      Goal #1   Current Statu

## 2020-03-17 ENCOUNTER — TELEPHONE (OUTPATIENT)
Dept: GASTROENTEROLOGY | Facility: CLINIC | Age: 80
End: 2020-03-17

## 2020-03-17 DIAGNOSIS — Z46.59 ENCOUNTER FOR PANCREATIC DUCT STENT EXCHANGE: Primary | ICD-10-CM

## 2020-03-17 DIAGNOSIS — K83.1 BILIARY STRICTURE: ICD-10-CM

## 2020-03-17 PROCEDURE — 99232 SBSQ HOSP IP/OBS MODERATE 35: CPT | Performed by: PHYSICIAN ASSISTANT

## 2020-03-17 RX ORDER — PANTOPRAZOLE SODIUM 40 MG/1
40 TABLET, DELAYED RELEASE ORAL
Status: DISCONTINUED | OUTPATIENT
Start: 2020-03-17 | End: 2020-03-18

## 2020-03-17 RX ORDER — POLYETHYLENE GLYCOL 3350 17 G/17G
17 POWDER, FOR SOLUTION ORAL DAILY PRN
Status: DISCONTINUED | OUTPATIENT
Start: 2020-03-17 | End: 2020-03-18

## 2020-03-17 NOTE — PLAN OF CARE
Problem: Patient Centered Care  Goal: Patient preferences are identified and integrated in the patient's plan of care  Description  Interventions:  - What would you like us to know as we care for you?  [facility]  - Provide time ADULT - FALL  Goal: Free from fall injury  Description  INTERVENTIONS:  - Assess pt frequently for physical needs  - Identify cognitive and physical deficits and behaviors that affect risk of falls. - [place] fall precautions as indicated by assessment. needed  - Evaluate fluid balance  Outcome: Progressing  Goal: Maintains or returns to baseline bowel function  Description  INTERVENTIONS:  - Assess bowel function  - Maintain adequate hydration with IV or PO as ordered and tolerated  - Evaluate effectiven part  Description  INTERVENTIONS:  - Support and protect limb and body alignment per provider's orders  - Instruct and reinforce with patient and family use of appropriate assistive device and precautions (e.g. spinal or hip dislocation precautions)  Homero Van

## 2020-03-17 NOTE — CM/SW NOTE
1050:  Pt's daughter contacted [facility] plan. Pt will return to [facility] when ins auth obtained. Laura contacting [facility], and providing updates. 1045: CM attempted to call pt's daughter a second time. Left Vm. Awaiting call back.    6454: OMAIRA

## 2020-03-17 NOTE — PROGRESS NOTES
[facility]    Progress Note    Geralene Shelter Patient Status:  Inpatient    1940 MRN [id number]   Location [facility] 4W/SW/SE Attending Aris Fabian MD   Hosp Day # 5 PCP No primary care provider on file.      Subjec gaseous collections. CONCLUSION:  1. Nonspecific-nonobstructive bowel gas pattern. 2.  Stable position of common bile duct stent. 3.  Postprocedural changes in the pelvis and prior left femur ORIF.     Dictated by (CST): Ulysses Malay, MD on 3/15/ 2/26/2020, 12:18. INDICATIONS: ERCP with sphincterotomy, balloon sweeping, and biliary stent placement under fluoroscopy. TECHNIQUE: There was no radiologist present during the procedure.    FLUOROSCOPY EXPOSURE TIME:   281.8 seconds # OF FLUOROGRAPHIC IM (CST): Rosetta Ross MD ON 2/25/2020 AT 18:52             Ct Abdomen+pelvis(contrast Only)(cpt=74177)    Result Date: 3/12/2020  PROCEDURE: CT ABDOMEN + PELVIS (CONTRAST ONLY) (CPT=74177)  COMPARISON: [facility], MRI ABDOMEN&MRCP W/3D (CPT=7 patient age. VASCULATURE:   No aneurysm is detected. RETROPERITONEUM: No mass or lymphadenopathy is apparent. BONES:   Demineralization with lumbar dextroscoliosis and multilevel thoracolumbar spine degenerative change.   Partially imaged proximal left fe comprehensive examination was performed utilizing a variety of imaging planes and imaging parameters to optimize visualization of suspected pathology. Images were obtained without the infusion of intravenous gadolinium-based contrast agent.    Magnetic reso BASES: No significant signal abnormality is identified. LIVER: No focal hepatic mass is seen. SPLEEN: No enlargement. ADRENALS: Unremarkable, without focal mass or enlargement. KIDNEYS: No hydronephrosis or solid masses are apparent.   A few T2 hyperint vasculature. MEDIAST/DAVID:   No visible mass or adenopathy. LUNGS/PLEURA: Mild bibasilar scarring/atelectasis. .  No significant pulmonary parenchymal abnormalities. No effusion or pleural thickening. BONES: Mild thoracolumbar scoliosis. OTHER: Negative.

## 2020-03-17 NOTE — TELEPHONE ENCOUNTER
Agreed.     Thanks    Louis Henley MD  [facility] - Gastroenterology  3/17/2020  11:55 AM

## 2020-03-17 NOTE — CM/SW NOTE
CE spoke with the pt's dtr. [facility] [phone] who confirmed the plan is for the pt. To return to [facility] for rehab. Updated information has been sent to [facility]. [facility] is aware and is working on insurance [address] [facility].       CHARU Khan ext [phone]

## 2020-03-17 NOTE — DISCHARGE SUMMARY
[facility]    Discharge Summary    Allison Joshua Patient Status:  Inpatient    1940 MRN [id number]   Location [facility] 4W/SW/SE Attending Chanel Diamond MD   Hosp Day # 5 PCP No primary care provider on file.      [place] placement, for possible stent exchange/removal.  It will be done as outpatient. # Left femur fracture, non weight bearing now  # Diarrhea- improved     Remained pain-free. Hemodynamically stable.   Cleared from GI, surgery for discharge back to skilled nu Refills:  0     zinc sulfate 220 (50 Zn) MG Caps  Commonly known as:  ZINCATE      Take 50 mg by mouth daily. Refills:  0     Zolpidem Tartrate 5 MG Tabs  Commonly known as:  AMBIEN      Take 5 mg by mouth nightly as needed for Sleep.    Refills:  0

## 2020-03-17 NOTE — TELEPHONE ENCOUNTER
Inpatient orders    This patient will ultimately need an ERCP for biliary stent exchange/removal with Dr. Ebenezer Reyes. We will route to scheduling staff to please work out dates/times with Dr. Ebenezer Reyes once elective cases are able to be scheduled.

## 2020-03-17 NOTE — PROGRESS NOTES
[facility] [address]     Gastroenterology Progress Note    Deion Wilson Patient Status:  Inpatient    1940 MRN [id number]   Location [facility] 4W/SW/SE Attending Sridhar Hopper MD   Hosp Day # 5 PCP No primary care prov extra-hepatic biliary ductal dilatation thought to be post-procedural. GI evaluation was requested due to request for stent exchange/removal during hospitalization.      #SBO: noted Dr. Carlson Later recommendations - bowel function has returned     # Biliary St

## 2020-03-18 VITALS
HEART RATE: 90 BPM | TEMPERATURE: 98 F | SYSTOLIC BLOOD PRESSURE: 135 MMHG | WEIGHT: 160 LBS | DIASTOLIC BLOOD PRESSURE: 58 MMHG | BODY MASS INDEX: 31.41 KG/M2 | OXYGEN SATURATION: 98 % | HEIGHT: 60 IN | RESPIRATION RATE: 18 BRPM

## 2020-03-18 NOTE — CM/SW NOTE
CM made aware during nursing rounds pt is medically stable for dc today. Pt will return to [facility].  Bed available today at [address] [facility] arranged for  at 1pm.   Estimated cost: $51  Pt's daughter Sagrario Plane aware of dc and agrees to cost and agrees to

## 2020-03-18 NOTE — PLAN OF CARE
Problem: Patient Centered Care  Goal: Patient preferences are identified and integrated in the patient's plan of care  Description  Interventions:  - What would you like us to know as we care for you?  [facility]  - Provide time ADULT - FALL  Goal: Free from fall injury  Description  INTERVENTIONS:  - Assess pt frequently for physical needs  - Identify cognitive and physical deficits and behaviors that affect risk of falls. - [place] fall precautions as indicated by assessment. needed  - Evaluate fluid balance  Outcome: Progressing  Goal: Maintains or returns to baseline bowel function  Description  INTERVENTIONS:  - Assess bowel function  - Maintain adequate hydration with IV or PO as ordered and tolerated  - Evaluate effectiven part  Description  INTERVENTIONS:  - Support and protect limb and body alignment per provider's orders  - Instruct and reinforce with patient and family use of appropriate assistive device and precautions (e.g. spinal or hip dislocation precautions)  Eliazar Varghese

## 2020-03-18 NOTE — OCCUPATIONAL THERAPY NOTE
Chart reviewed. Attempt made for occupational therapy treatment. RN cleared pt participation. Upon arrival to room, pt sitting up in bed (long-sitting), on the phone.  Pt deferring therapy at this time --pt reported she is not wanting to participate in ther

## 2020-03-18 NOTE — PLAN OF CARE
Problem: Patient Centered Care  Goal: Patient preferences are identified and integrated in the patient's plan of care  Description  Interventions:  - What would you like us to know as we care for you?  [facility]  - Provide time Adequate for Discharge     Problem: SAFETY ADULT - FALL  Goal: Free from fall injury  Description  INTERVENTIONS:  - Assess pt frequently for physical needs  - Identify cognitive and physical deficits and behaviors that affect risk of falls.   - [place] f diet as tolerated, if ordered  - Obtain nutritional consult as needed  - Evaluate fluid balance  Outcome: Adequate for Discharge  Goal: Maintains or returns to baseline bowel function  Description  INTERVENTIONS:  - Assess bowel function  - Maintain adequa limitations with patient/family  Outcome: Adequate for Discharge  Goal: Maintain proper alignment of affected body part  Description  INTERVENTIONS:  - Support and protect limb and body alignment per provider's orders  - Instruct and reinforce with patient

## 2020-03-19 NOTE — TELEPHONE ENCOUNTER
Please schedule ERCP with stent exchange with cholangioscopy (spy glass) at the hospital with general anesthesia for diagnosis of biliary stricture and will need to be off enoxaparin for 24 hours before the procedure.      This will need to have efe/terrence

## 2020-03-19 NOTE — PAYOR COMM NOTE
--------------  DISCHARGE REVIEW    [facility]  Subscriber #:  [id number]  Authorization Number: [id number]    Admit date: 3/12/20  Admit time:  4609  Discharge Date: 3/18/2020  1:30 PM     Admitting Physician: Chanel Diamond MD  Attending Monty Zee

## 2020-03-19 NOTE — TELEPHONE ENCOUNTER
Dr. Benton Lindsey--    Please advise on full orders and do you want this patient scheduled sooner or later.  Thank you

## 2020-03-20 NOTE — TELEPHONE ENCOUNTER
I scheduled this patient in June per the patient since she is now in rehab with a fracture and would like to heal first    Scheduled for:  ERCP w/spy glass 62231  Provider Name: Dr. Clayton Hay  Date:  6/1/20  Location:  [facility]  Sedation:  General  Time:   1430

## 2020-05-28 ENCOUNTER — TELEPHONE (OUTPATIENT)
Dept: GASTROENTEROLOGY | Facility: CLINIC | Age: 80
End: 2020-05-28

## 2020-05-28 DIAGNOSIS — K83.1 BILIARY STRICTURE: ICD-10-CM

## 2020-05-28 DIAGNOSIS — Z46.59 ENCOUNTER FOR PANCREATIC DUCT STENT EXCHANGE: Primary | ICD-10-CM

## 2020-05-28 RX ORDER — PANTOPRAZOLE SODIUM 40 MG/1
40 TABLET, DELAYED RELEASE ORAL
COMMUNITY

## 2020-05-28 NOTE — TELEPHONE ENCOUNTER
Scheduled for:  ERCP w/spy glass 35439  Provider Name: Dr. Bhatti Player  Date:  6/1/20  Location:  [address] [place] [address] insurance contacted:      Deirdre Holland [phone]                                                                 Rep. Contacted: Dylan Gonzalez.       Dx: A19

## 2020-05-29 NOTE — TELEPHONE ENCOUNTER
Alysha Jimenes? called back on my direct line. States she re-faxed the auth with correct CPT and diag codes. I have not received. She is going to re-fax to [phone] and to [phone].   Her personal number for me to call her back if not received is [phone]

## 2020-05-29 NOTE — TELEPHONE ENCOUNTER
Nikole Randle 8 [phone]  contacted and per Acquraegann order referral pending and Pooja Willis name needs to be attached but order for ERCP complete. Esteban stts that referral team will contact office.

## 2020-05-29 NOTE — TELEPHONE ENCOUNTER
I had contacted Priya Nuno at [facility] below and she was sending high priority message to referral team. I spoke to Silvana in or PPD team, recommended I speak to referral team directly. This referral request was first entered in March.  Silvana was working

## 2020-05-29 NOTE — TELEPHONE ENCOUNTER
I received referral from [address] [place] [address] [phone] , fax [phone]. Authorization #[id number] for 6 visits, but does not have the CPT code.  Does have diagnosis codes  I called back and they will send to [facility] to re-fax with CPT code [phone] [id number]

## 2020-06-01 ENCOUNTER — ANESTHESIA (OUTPATIENT)
Dept: ENDOSCOPY | Facility: HOSPITAL | Age: 80
End: 2020-06-01
Payer: MEDICARE

## 2020-06-01 ENCOUNTER — HOSPITAL ENCOUNTER (OUTPATIENT)
Facility: HOSPITAL | Age: 80
Setting detail: HOSPITAL OUTPATIENT SURGERY
Discharge: HOME | End: 2020-06-01
Attending: INTERNAL MEDICINE | Admitting: INTERNAL MEDICINE
Payer: MEDICARE

## 2020-06-01 ENCOUNTER — APPOINTMENT (OUTPATIENT)
Dept: GENERAL RADIOLOGY | Facility: HOSPITAL | Age: 80
End: 2020-06-01
Attending: INTERNAL MEDICINE
Payer: MEDICARE

## 2020-06-01 ENCOUNTER — ANESTHESIA EVENT (OUTPATIENT)
Dept: ENDOSCOPY | Facility: HOSPITAL | Age: 80
End: 2020-06-01
Payer: MEDICARE

## 2020-06-01 DIAGNOSIS — K31.89 DUODENAL NODULE: Primary | ICD-10-CM

## 2020-06-01 DIAGNOSIS — Z46.59 ENCOUNTER FOR PANCREATIC DUCT STENT EXCHANGE: ICD-10-CM

## 2020-06-01 DIAGNOSIS — K83.1 BILIARY STRICTURE: ICD-10-CM

## 2020-06-01 PROCEDURE — 43276 ERCP STENT EXCHANGE W/DILATE: CPT | Performed by: INTERNAL MEDICINE

## 2020-06-01 PROCEDURE — 74328 X-RAY BILE DUCT ENDOSCOPY: CPT | Performed by: INTERNAL MEDICINE

## 2020-06-01 PROCEDURE — 0F798DZ DILATION OF COMMON BILE DUCT WITH INTRALUMINAL DEVICE, VIA NATURAL OR ARTIFICIAL OPENING ENDOSCOPIC: ICD-10-PCS | Performed by: INTERNAL MEDICINE

## 2020-06-01 PROCEDURE — 0DB98ZX EXCISION OF DUODENUM, VIA NATURAL OR ARTIFICIAL OPENING ENDOSCOPIC, DIAGNOSTIC: ICD-10-PCS | Performed by: INTERNAL MEDICINE

## 2020-06-01 PROCEDURE — 0FC98ZZ EXTIRPATION OF MATTER FROM COMMON BILE DUCT, VIA NATURAL OR ARTIFICIAL OPENING ENDOSCOPIC: ICD-10-PCS | Performed by: INTERNAL MEDICINE

## 2020-06-01 PROCEDURE — 43261 ENDO CHOLANGIOPANCREATOGRAPH: CPT | Performed by: INTERNAL MEDICINE

## 2020-06-01 PROCEDURE — 0FPB8DZ REMOVAL OF INTRALUMINAL DEVICE FROM HEPATOBILIARY DUCT, VIA NATURAL OR ARTIFICIAL OPENING ENDOSCOPIC: ICD-10-PCS | Performed by: INTERNAL MEDICINE

## 2020-06-01 RX ORDER — DEXAMETHASONE SODIUM PHOSPHATE 4 MG/ML
VIAL (ML) INJECTION AS NEEDED
Status: DISCONTINUED | OUTPATIENT
Start: 2020-06-01 | End: 2020-06-01 | Stop reason: SURG

## 2020-06-01 RX ORDER — ACETAMINOPHEN 325 MG/1
650 TABLET ORAL ONCE AS NEEDED
Status: COMPLETED | OUTPATIENT
Start: 2020-06-01 | End: 2020-06-01

## 2020-06-01 RX ORDER — NEOSTIGMINE METHYLSULFATE 1 MG/ML
INJECTION INTRAVENOUS AS NEEDED
Status: DISCONTINUED | OUTPATIENT
Start: 2020-06-01 | End: 2020-06-01 | Stop reason: SURG

## 2020-06-01 RX ORDER — ROCURONIUM BROMIDE 10 MG/ML
INJECTION, SOLUTION INTRAVENOUS AS NEEDED
Status: DISCONTINUED | OUTPATIENT
Start: 2020-06-01 | End: 2020-06-01 | Stop reason: SURG

## 2020-06-01 RX ORDER — NALOXONE HYDROCHLORIDE 0.4 MG/ML
80 INJECTION, SOLUTION INTRAMUSCULAR; INTRAVENOUS; SUBCUTANEOUS AS NEEDED
Status: DISCONTINUED | OUTPATIENT
Start: 2020-06-01 | End: 2020-06-01 | Stop reason: HOSPADM

## 2020-06-01 RX ORDER — ONDANSETRON 2 MG/ML
INJECTION INTRAMUSCULAR; INTRAVENOUS AS NEEDED
Status: DISCONTINUED | OUTPATIENT
Start: 2020-06-01 | End: 2020-06-01 | Stop reason: SURG

## 2020-06-01 RX ORDER — LIDOCAINE HYDROCHLORIDE 10 MG/ML
INJECTION, SOLUTION EPIDURAL; INFILTRATION; INTRACAUDAL; PERINEURAL AS NEEDED
Status: DISCONTINUED | OUTPATIENT
Start: 2020-06-01 | End: 2020-06-01 | Stop reason: SURG

## 2020-06-01 RX ORDER — SODIUM CHLORIDE, SODIUM LACTATE, POTASSIUM CHLORIDE, CALCIUM CHLORIDE 600; 310; 30; 20 MG/100ML; MG/100ML; MG/100ML; MG/100ML
INJECTION, SOLUTION INTRAVENOUS CONTINUOUS
Status: DISCONTINUED | OUTPATIENT
Start: 2020-06-01 | End: 2020-06-01

## 2020-06-01 RX ORDER — EPHEDRINE SULFATE 50 MG/ML
INJECTION, SOLUTION INTRAVENOUS AS NEEDED
Status: DISCONTINUED | OUTPATIENT
Start: 2020-06-01 | End: 2020-06-01 | Stop reason: SURG

## 2020-06-01 RX ORDER — GLYCOPYRROLATE 0.2 MG/ML
INJECTION, SOLUTION INTRAMUSCULAR; INTRAVENOUS AS NEEDED
Status: DISCONTINUED | OUTPATIENT
Start: 2020-06-01 | End: 2020-06-01 | Stop reason: SURG

## 2020-06-01 RX ADMIN — LIDOCAINE HYDROCHLORIDE 50 MG: 10 INJECTION, SOLUTION EPIDURAL; INFILTRATION; INTRACAUDAL; PERINEURAL at 15:31:00

## 2020-06-01 RX ADMIN — ONDANSETRON 4 MG: 2 INJECTION INTRAMUSCULAR; INTRAVENOUS at 15:29:00

## 2020-06-01 RX ADMIN — NEOSTIGMINE METHYLSULFATE 3 MG: 1 INJECTION INTRAVENOUS at 16:49:00

## 2020-06-01 RX ADMIN — GLYCOPYRROLATE 0.2 MG: 0.2 INJECTION, SOLUTION INTRAMUSCULAR; INTRAVENOUS at 15:29:00

## 2020-06-01 RX ADMIN — ROCURONIUM BROMIDE 15 MG: 10 INJECTION, SOLUTION INTRAVENOUS at 15:40:00

## 2020-06-01 RX ADMIN — GLYCOPYRROLATE 0.6 MG: 0.2 INJECTION, SOLUTION INTRAMUSCULAR; INTRAVENOUS at 16:49:00

## 2020-06-01 RX ADMIN — DEXAMETHASONE SODIUM PHOSPHATE 4 MG: 4 MG/ML VIAL (ML) INJECTION at 15:29:00

## 2020-06-01 RX ADMIN — ROCURONIUM BROMIDE 10 MG: 10 INJECTION, SOLUTION INTRAVENOUS at 15:31:00

## 2020-06-01 RX ADMIN — SODIUM CHLORIDE, SODIUM LACTATE, POTASSIUM CHLORIDE, CALCIUM CHLORIDE: 600; 310; 30; 20 INJECTION, SOLUTION INTRAVENOUS at 16:52:00

## 2020-06-01 RX ADMIN — EPHEDRINE SULFATE 10 MG: 50 INJECTION, SOLUTION INTRAVENOUS at 16:14:00

## 2020-06-01 NOTE — ANESTHESIA PREPROCEDURE EVALUATION
Anesthesia PreOp Note    HPI:     Mark Anthony Diamond is a 78year old female who presents for preoperative consultation requested by: Yoselin Jackson MD    Date of Surgery: 6/1/2020    Procedure(s):  ENDOSCOPIC RETROGRADE CHOLANGIOPANCREATOGRAPHY (ERC Disp: , Rfl: , 5/25/2020  acetaminophen 500 MG Oral Tab, Take 500 mg by mouth every 4 (four) hours as needed for Pain., Disp: , Rfl: , 5/31/2020  Zolpidem Tartrate 5 MG Oral Tab, Take 5 mg by mouth nightly as needed for Sleep., Disp: , Rfl: , 5/30/2020  do Smoking status: Former Smoker        Quit date: 1975        Years since quittin.4      Smokeless tobacco: Never Used    Substance and Sexual Activity      Alcohol use: Not Currently      Drug use: Never      Sexual activity: Not on file    Life m (5')         Anesthesia Evaluation     Patient summary reviewed and Nursing notes reviewed    Airway   Dental    (+) upper dentures and lower dentures    Pulmonary     breath sounds clear to auscultation  Cardiovascular   Exercise tolerance: good    Rhyt Stable.

## 2020-06-01 NOTE — OPERATIVE REPORT
Endoscopic retrograde cholangio-pancreatography (ERCP) report    Lucie Valiente     1940 Age 78year old   PCP No primary care provider on file.  Endoscopist Anand Herrera MD     Date of procedure: 20    Procedure: ERCP w/ cholangios previously placed plastic biliary stent protruding from the ampulla, which was otherwise unremarkable with prior sphincterotomy. A snare was placed around the end of the snare and removed entirely from the bile duct.  There were a few small stone fragments biopsied as was a second appearing nodule on the opposite wall. Impression:  1. Mid bile duct stricture s/p stent exchange, cholangnioscopy, stone removal, biopsy and dilatation   2. Two duodenal nodules biopsied    Recommend:   1.  Monitor liver enzyme

## 2020-06-01 NOTE — ANESTHESIA PROCEDURE NOTES
Airway  Airway not difficult    General Information and Staff    Anesthesiologist: Carmelita Beltran DO  Resident/CRNA: Sagar Mackenzie CRNA  Performed: anesthesiologist and CRNA     Indications and Patient Condition  Indications for airway managemen

## 2020-06-01 NOTE — H&P
History & Physical Examination    Patient Name: Napoleon Alba  MRN: [id number]  CSN: [id number]  YOB: 1940    Diagnosis: biliary stricture, abnormal MRCP    EZETIMIBE OR, Take 10 mg by mouth., Disp: , Rfl:   Pantoprazole Sodium 40 MG Or Date   • Anemia    • Arthritis    • Diverticulitis    • Esophageal reflux    • High cholesterol      Past Surgical History:   Procedure Laterality Date   • APPENDECTOMY     • CHOLECYSTECTOMY     • ENDOSCOPIC RETROGRADE CHOLANGIOPANCREATOGRAPHY (ERCP) N/A 2

## 2020-06-01 NOTE — ADDENDUM NOTE
Addendum  created 06/01/20 1705 by Nelson Guerrero, CRNA    Review and Sign - Ready for Procedure, Review and Sign - Signed

## 2020-06-01 NOTE — ANESTHESIA POSTPROCEDURE EVALUATION
Call placed and left message to please call [facility] number given.    Instructed to call back with any further questions.    Patient: Alejandra Manzanares    Procedure Summary     Date:  06/01/20 Room / Location:  [address] [place] [address] ENDOSCOPY 01 / [address] [place] [address] ENDOSCOPY    Anesthesia Start:  9052 Anesthesia Stop:      Procedure:  ENDOSCOPIC RETROGRADE CHOLANGIOPANCREATOGRAPHY (ERCP) (N/A ) Diagnosis:

## 2020-06-02 VITALS
BODY MASS INDEX: 27.48 KG/M2 | TEMPERATURE: 98 F | HEART RATE: 84 BPM | DIASTOLIC BLOOD PRESSURE: 54 MMHG | WEIGHT: 140 LBS | RESPIRATION RATE: 18 BRPM | HEIGHT: 60 IN | OXYGEN SATURATION: 97 % | SYSTOLIC BLOOD PRESSURE: 122 MMHG

## 2020-06-02 NOTE — TELEPHONE ENCOUNTER
Below noted. See my extensive communication with [facility] below, with numerous conversations to correct their mistakes. They can resolve the issue, there is nothing further this RN can do.

## 2020-06-02 NOTE — TELEPHONE ENCOUNTER
Per insurance verification request PA for ERCP attempted to be checked via avality No PA on file for Dr. Rafael Brenner or [address] contacted at [phone] and per rep Mike PA was submitted but has incorrect information of doctor and faci

## 2020-06-02 NOTE — TELEPHONE ENCOUNTER
Noted, No fax received yet. It appears that [facility] finally got it right. Tylor Peralta RN, please note below and \"double check\" that they did it right. Thanks.

## 2020-06-02 NOTE — TELEPHONE ENCOUNTER
Margi contacted at [phone] to review status of Charity Nichole #[id number] that [facility] was to update facility and provider auth. Call dropped in transfer. [address] contact at [phone] and confirmed that PA updated.  Request for fax to

## 2020-06-03 ENCOUNTER — TELEPHONE (OUTPATIENT)
Dept: GASTROENTEROLOGY | Facility: CLINIC | Age: 80
End: 2020-06-03

## 2020-06-03 DIAGNOSIS — K83.1 BILIARY STRICTURE: Primary | ICD-10-CM

## 2020-06-03 DIAGNOSIS — R93.3 ABNORMAL MAGNETIC RESONANCE CHOLANGIOPANCREATOGRAPHY (MRCP): ICD-10-CM

## 2020-06-03 NOTE — TELEPHONE ENCOUNTER
Called patient and discussed results of her biopsies from ERCP which showed a benign process no malignancy with this and prior imaging (CT and MRI) and brushing suggestive of a benign process.      Discussed will re-evaluate the stricture in 3 months with EDILSON

## 2020-06-03 NOTE — TELEPHONE ENCOUNTER
[address] [facility] [phone] contacted and discussed with Daigenna Epifanio what is needed on referral. Per Daivd Epifanio all referral staff is working off site. Reviewed all information required on PA/referral. LLXQCXU reviewed PA and noted incorrect/missing.  Anish Orourke

## 2020-06-09 NOTE — TELEPHONE ENCOUNTER
[address] contacted [phone] and explained to  rep Deisy that attempted several times to request referral from PCP and unable to receive.  Per  Deisy will process referral.   Dr. Cindy Donald Name, NPI, Address  [address] [place] [address]  Name, NPI, Tax ID, Address  Dx: Yair Ayala

## 2020-07-28 NOTE — TELEPHONE ENCOUNTER
Dr. Griselda So -    I contacted and spoke with this patient today to scheduled procedures. First available was 10/5/2020. It's about 4 months instead of 3, is that date okay? Please advise, thank you.

## 2020-07-28 NOTE — TELEPHONE ENCOUNTER
This is ok.     Thanks    Sabino Maria MD  [facility], [facility] - Gastroenterology  7/28/2020  3:34 PM

## 2020-07-28 NOTE — TELEPHONE ENCOUNTER
Thank you. Spoke to patient again to verify date and time. She verbalized understanding and will watch for instruction sheet in the mail.

## 2020-07-28 NOTE — TELEPHONE ENCOUNTER
Scheduled for:  [phone] / EUS [id number] with possible FNA and ERCP 23068  Provider Name:  Dr. Cabrera Situ  Date:  10/5/2020  Location:  [facility]  Sedation:  MAC  Time:  2:00 pm, arrival 1:00 pm  Prep:  NPO after midnight  Meds/Allergies Reconciled?:  Yes  Diagnosis

## 2020-10-02 ENCOUNTER — APPOINTMENT (OUTPATIENT)
Dept: LAB | Facility: HOSPITAL | Age: 80
End: 2020-10-02
Attending: INTERNAL MEDICINE
Payer: MEDICARE

## 2020-10-02 DIAGNOSIS — Z01.818 PREOP TESTING: ICD-10-CM

## 2020-10-05 ENCOUNTER — HOSPITAL ENCOUNTER (OUTPATIENT)
Facility: HOSPITAL | Age: 80
Setting detail: HOSPITAL OUTPATIENT SURGERY
Discharge: HOME | End: 2020-10-05
Attending: INTERNAL MEDICINE | Admitting: INTERNAL MEDICINE
Payer: MEDICARE

## 2020-10-05 ENCOUNTER — APPOINTMENT (OUTPATIENT)
Dept: GENERAL RADIOLOGY | Facility: HOSPITAL | Age: 80
End: 2020-10-05
Attending: INTERNAL MEDICINE
Payer: MEDICARE

## 2020-10-05 ENCOUNTER — ANESTHESIA EVENT (OUTPATIENT)
Dept: ENDOSCOPY | Facility: HOSPITAL | Age: 80
End: 2020-10-05
Payer: MEDICARE

## 2020-10-05 ENCOUNTER — ANESTHESIA (OUTPATIENT)
Dept: ENDOSCOPY | Facility: HOSPITAL | Age: 80
End: 2020-10-05
Payer: MEDICARE

## 2020-10-05 VITALS
DIASTOLIC BLOOD PRESSURE: 85 MMHG | TEMPERATURE: 97 F | HEIGHT: 60 IN | OXYGEN SATURATION: 99 % | SYSTOLIC BLOOD PRESSURE: 111 MMHG | HEART RATE: 83 BPM | BODY MASS INDEX: 27.48 KG/M2 | RESPIRATION RATE: 12 BRPM | WEIGHT: 140 LBS

## 2020-10-05 DIAGNOSIS — Z01.818 PREOP TESTING: Primary | ICD-10-CM

## 2020-10-05 DIAGNOSIS — K83.1 BILIARY STRICTURE: ICD-10-CM

## 2020-10-05 DIAGNOSIS — R93.3 ABNORMAL MAGNETIC RESONANCE CHOLANGIOPANCREATOGRAPHY (MRCP): ICD-10-CM

## 2020-10-05 PROCEDURE — 0FPB8DZ REMOVAL OF INTRALUMINAL DEVICE FROM HEPATOBILIARY DUCT, VIA NATURAL OR ARTIFICIAL OPENING ENDOSCOPIC: ICD-10-PCS | Performed by: INTERNAL MEDICINE

## 2020-10-05 PROCEDURE — 43264 ERCP REMOVE DUCT CALCULI: CPT | Performed by: INTERNAL MEDICINE

## 2020-10-05 PROCEDURE — 0FC98ZZ EXTIRPATION OF MATTER FROM COMMON BILE DUCT, VIA NATURAL OR ARTIFICIAL OPENING ENDOSCOPIC: ICD-10-PCS | Performed by: INTERNAL MEDICINE

## 2020-10-05 PROCEDURE — 0F798DZ DILATION OF COMMON BILE DUCT WITH INTRALUMINAL DEVICE, VIA NATURAL OR ARTIFICIAL OPENING ENDOSCOPIC: ICD-10-PCS | Performed by: INTERNAL MEDICINE

## 2020-10-05 PROCEDURE — 0DJ08ZZ INSPECTION OF UPPER INTESTINAL TRACT, VIA NATURAL OR ARTIFICIAL OPENING ENDOSCOPIC: ICD-10-PCS | Performed by: INTERNAL MEDICINE

## 2020-10-05 PROCEDURE — 43276 ERCP STENT EXCHANGE W/DILATE: CPT | Performed by: INTERNAL MEDICINE

## 2020-10-05 PROCEDURE — 74328 X-RAY BILE DUCT ENDOSCOPY: CPT | Performed by: INTERNAL MEDICINE

## 2020-10-05 PROCEDURE — 43259 EGD US EXAM DUODENUM/JEJUNUM: CPT | Performed by: INTERNAL MEDICINE

## 2020-10-05 PROCEDURE — 43261 ENDO CHOLANGIOPANCREATOGRAPH: CPT | Performed by: INTERNAL MEDICINE

## 2020-10-05 RX ORDER — MORPHINE SULFATE 10 MG/ML
6 INJECTION, SOLUTION INTRAMUSCULAR; INTRAVENOUS EVERY 10 MIN PRN
Status: DISCONTINUED | OUTPATIENT
Start: 2020-10-05 | End: 2020-10-05 | Stop reason: HOSPADM

## 2020-10-05 RX ORDER — ACETAMINOPHEN 325 MG/1
650 TABLET ORAL ONCE
Status: COMPLETED | OUTPATIENT
Start: 2020-10-05 | End: 2020-10-05

## 2020-10-05 RX ORDER — MORPHINE SULFATE 4 MG/ML
4 INJECTION, SOLUTION INTRAMUSCULAR; INTRAVENOUS EVERY 10 MIN PRN
Status: DISCONTINUED | OUTPATIENT
Start: 2020-10-05 | End: 2020-10-05 | Stop reason: HOSPADM

## 2020-10-05 RX ORDER — PROCHLORPERAZINE EDISYLATE 5 MG/ML
5 INJECTION INTRAMUSCULAR; INTRAVENOUS ONCE AS NEEDED
Status: DISCONTINUED | OUTPATIENT
Start: 2020-10-05 | End: 2020-10-05 | Stop reason: HOSPADM

## 2020-10-05 RX ORDER — EPHEDRINE SULFATE 50 MG/ML
INJECTION, SOLUTION INTRAVENOUS AS NEEDED
Status: DISCONTINUED | OUTPATIENT
Start: 2020-10-05 | End: 2020-10-05 | Stop reason: SURG

## 2020-10-05 RX ORDER — SODIUM CHLORIDE, SODIUM LACTATE, POTASSIUM CHLORIDE, CALCIUM CHLORIDE 600; 310; 30; 20 MG/100ML; MG/100ML; MG/100ML; MG/100ML
INJECTION, SOLUTION INTRAVENOUS CONTINUOUS
Status: DISCONTINUED | OUTPATIENT
Start: 2020-10-05 | End: 2020-10-05 | Stop reason: HOSPADM

## 2020-10-05 RX ORDER — LIDOCAINE HYDROCHLORIDE 10 MG/ML
INJECTION, SOLUTION EPIDURAL; INFILTRATION; INTRACAUDAL; PERINEURAL AS NEEDED
Status: DISCONTINUED | OUTPATIENT
Start: 2020-10-05 | End: 2020-10-05 | Stop reason: SURG

## 2020-10-05 RX ORDER — HALOPERIDOL 5 MG/ML
0.25 INJECTION INTRAMUSCULAR ONCE AS NEEDED
Status: DISCONTINUED | OUTPATIENT
Start: 2020-10-05 | End: 2020-10-05 | Stop reason: HOSPADM

## 2020-10-05 RX ORDER — NALOXONE HYDROCHLORIDE 0.4 MG/ML
80 INJECTION, SOLUTION INTRAMUSCULAR; INTRAVENOUS; SUBCUTANEOUS AS NEEDED
Status: DISCONTINUED | OUTPATIENT
Start: 2020-10-05 | End: 2020-10-05 | Stop reason: HOSPADM

## 2020-10-05 RX ORDER — HYDROMORPHONE HYDROCHLORIDE 1 MG/ML
0.2 INJECTION, SOLUTION INTRAMUSCULAR; INTRAVENOUS; SUBCUTANEOUS EVERY 5 MIN PRN
Status: DISCONTINUED | OUTPATIENT
Start: 2020-10-05 | End: 2020-10-05 | Stop reason: HOSPADM

## 2020-10-05 RX ORDER — ONDANSETRON 2 MG/ML
4 INJECTION INTRAMUSCULAR; INTRAVENOUS ONCE
Status: COMPLETED | OUTPATIENT
Start: 2020-10-05 | End: 2020-10-05

## 2020-10-05 RX ORDER — HYDROMORPHONE HYDROCHLORIDE 1 MG/ML
0.4 INJECTION, SOLUTION INTRAMUSCULAR; INTRAVENOUS; SUBCUTANEOUS EVERY 5 MIN PRN
Status: DISCONTINUED | OUTPATIENT
Start: 2020-10-05 | End: 2020-10-05 | Stop reason: HOSPADM

## 2020-10-05 RX ORDER — MORPHINE SULFATE 4 MG/ML
2 INJECTION, SOLUTION INTRAMUSCULAR; INTRAVENOUS EVERY 10 MIN PRN
Status: DISCONTINUED | OUTPATIENT
Start: 2020-10-05 | End: 2020-10-05 | Stop reason: HOSPADM

## 2020-10-05 RX ORDER — ONDANSETRON 2 MG/ML
4 INJECTION INTRAMUSCULAR; INTRAVENOUS ONCE AS NEEDED
Status: DISCONTINUED | OUTPATIENT
Start: 2020-10-05 | End: 2020-10-05 | Stop reason: HOSPADM

## 2020-10-05 RX ORDER — HYDROMORPHONE HYDROCHLORIDE 1 MG/ML
0.6 INJECTION, SOLUTION INTRAMUSCULAR; INTRAVENOUS; SUBCUTANEOUS EVERY 5 MIN PRN
Status: DISCONTINUED | OUTPATIENT
Start: 2020-10-05 | End: 2020-10-05 | Stop reason: HOSPADM

## 2020-10-05 RX ORDER — SODIUM CHLORIDE, SODIUM LACTATE, POTASSIUM CHLORIDE, CALCIUM CHLORIDE 600; 310; 30; 20 MG/100ML; MG/100ML; MG/100ML; MG/100ML
INJECTION, SOLUTION INTRAVENOUS CONTINUOUS
Status: DISCONTINUED | OUTPATIENT
Start: 2020-10-05 | End: 2020-10-05

## 2020-10-05 RX ADMIN — SODIUM CHLORIDE, SODIUM LACTATE, POTASSIUM CHLORIDE, CALCIUM CHLORIDE: 600; 310; 30; 20 INJECTION, SOLUTION INTRAVENOUS at 16:32:00

## 2020-10-05 RX ADMIN — SODIUM CHLORIDE, SODIUM LACTATE, POTASSIUM CHLORIDE, CALCIUM CHLORIDE: 600; 310; 30; 20 INJECTION, SOLUTION INTRAVENOUS at 14:20:00

## 2020-10-05 RX ADMIN — LIDOCAINE HYDROCHLORIDE 50 MG: 10 INJECTION, SOLUTION EPIDURAL; INFILTRATION; INTRACAUDAL; PERINEURAL at 14:26:00

## 2020-10-05 RX ADMIN — EPHEDRINE SULFATE 5 MG: 50 INJECTION, SOLUTION INTRAVENOUS at 15:48:00

## 2020-10-05 RX ADMIN — EPHEDRINE SULFATE 10 MG: 50 INJECTION, SOLUTION INTRAVENOUS at 15:14:00

## 2020-10-05 RX ADMIN — EPHEDRINE SULFATE 10 MG: 50 INJECTION, SOLUTION INTRAVENOUS at 14:50:00

## 2020-10-05 NOTE — ANESTHESIA PROCEDURE NOTES
Airway  Date/Time: 10/5/2020 2:29 PM  Urgency: elective    Airway not difficult    General Information and Staff    Patient location during procedure: endo  Resident/CRNA: Henrry Hinds CRNA  Performed: CRNA     Indications and Patient Condition  Indicatio

## 2020-10-05 NOTE — ANESTHESIA POSTPROCEDURE EVALUATION
Patient: Sherry Jones    Procedure Summary     Date: 10/05/20 Room / Location: [facility] ENDOSCOPY 01 / [facility] ENDOSCOPY    Anesthesia Start: 0460 Anesthesia Stop: 6690    Procedures:       ESOPHAGOGASTRODUODENOSCOPY (EGD) (N/A )      ENDOSCOPIC ULTRASOUND (

## 2020-10-05 NOTE — OPERATIVE REPORT
Esophagogastroduodenoscopy (EGD) & Endoscopic Ultrasound (EUS) Report    Ashley Ayala     1940 Age [de-identified]year old   PCP No primary care provider on file.  Endoscopist Lenore Vang MD     Date of procedure: 10/05/20    Procedure: EGD and EU esophageal mucosa appeared unremarkable. 2. Stomach: The stomach distended normally. Normal rugal folds were seen. The pylorus was patent. The gastric mucosa appeared unremarkable. Retroflexion revealed a normal fundus and cardia.    3. Duodenum: The duode

## 2020-10-05 NOTE — H&P
History & Physical Examination    Patient Name: Jazlyn Gonzalez  MRN: [id number]  [facility]: [id number]  YOB: 1940    Diagnosis: biliary stricture, abnormal MRCP      •  Pantoprazole Sodium 40 MG Oral Tab EC, Take 40 mg by mouth every morning b radiation    • High cholesterol    • Personal history of antineoplastic chemotherapy    • Visual impairment      Past Surgical History:   Procedure Laterality Date   • APPENDECTOMY     • CHOLECYSTECTOMY     • ENDOSCOPIC RETROGRADE CHOLANGIOPANCREATOGRAPHY risks of bleeding, infection, pain, perforation, anesthesia related cardiopulmonary complications, and pancreatitis all requiring or leading to prolonged hospital stay and/or surgical intervention.  I also mentioned the available alternatives including imag

## 2020-10-05 NOTE — OR NURSING
Received patient from PACU with nausea, and a pressure in the head that radiates from the eye to the back of the head 8/10. Dr. Bhatti Player notified and ordered Zofran 4mg IVP and tylenol 650mg PO.   Vitals are stable, patient denies any blurry vision or vis

## 2020-10-05 NOTE — OPERATIVE REPORT
Endoscopic retrograde cholangio-pancreatography (ERCP) report    Cloteal Harms     1940 Age [de-identified]year old   PCP No primary care provider on file.  Endoscopist Justyna Rader MD     Date of procedure: 10/05/20    Procedure: ERCP w/ stone mohinder sphincterotomy, which was otherwise unremarkable. A snare was placed around the end of the each stent and removed sequentially entirely from the bile duct.  The bile duct was then selectively cannulated with a 12-15 mm balloon catheter loaded with an 0.035 removal, biopsy and dilatation     Recommend:   1. Monitor liver enzymes  2. Clear liquid diet as tolerated for 4 hours then advance to soft diet this evening and general diet tomorrow if feeling well  3. Await cytology results  4.  ERCP for stent exchange/

## 2020-10-05 NOTE — ANESTHESIA PREPROCEDURE EVALUATION
Anesthesia PreOp Note    HPI:     Mathieu Cordero is a [de-identified]year old female who presents for preoperative consultation requested by: Micaela Kirkland MD    Date of Surgery: 10/5/2020    Procedure(s):  ESOPHAGOGASTRODUODENOSCOPY (EGD)  ENDOSCOPIC ULTRA REMOVAL GALLBLADDER           •  Pantoprazole Sodium 40 MG Oral Tab EC, Take 40 mg by mouth every morning before breakfast., Disp: , Rfl: , 9/28/2020    •  Alum & Mag Hydroxide-Simeth 506-263-63 MG/5ML Oral Suspension, Take 15 mL by mouth 4 (four) times da file    Occupational History      Not on file    Social Needs      Financial resource strain: Not on file      Food insecurity        Worry: Not on file        Inability: Not on file      Transportation needs        Medical: Not on file        Non-medical: dentures and lower dentures    Pulmonary - negative ROS    breath sounds clear to auscultation  Cardiovascular - negative ROS    Rhythm: regular  Rate: normal    Neuro/Psych - negative ROS     GI/Hepatic/Renal      Endo/Other - negative ROS   Abdominal

## 2020-10-06 ENCOUNTER — TELEPHONE (OUTPATIENT)
Dept: GASTROENTEROLOGY | Facility: CLINIC | Age: 80
End: 2020-10-06

## 2020-10-06 DIAGNOSIS — K83.1 BILIARY STRICTURE: Primary | ICD-10-CM

## 2020-10-06 NOTE — TELEPHONE ENCOUNTER
Called patient to see how she is doing. No answer. Left voice message    Called patient's daughter. She says she is texting her because her throat is a bit sore from yesterday. Says she hasn't complained of headache today.     Discussed her biopsy was

## 2020-10-07 ENCOUNTER — TELEPHONE (OUTPATIENT)
Dept: GASTROENTEROLOGY | Facility: CLINIC | Age: 80
End: 2020-10-07

## 2020-10-07 NOTE — TELEPHONE ENCOUNTER
Pt states she has blurry vision and inquiring if this could be related to EGD on 10/5/20. Pt states after EGD she felt pressure in her head. Pt also states she was nauseous. Pt inquiring if pressure and blurry vision will get better.  Please call [phone]

## 2020-10-07 NOTE — TELEPHONE ENCOUNTER
Dr Clayton Hay, you did EGD/EUS/ERCP 10/5. Patient contacted. States in recovery she told nurses she had bad headache and nausea/dizziness and was given Tylenol and \"nausea medicine\" . Improved somewhat and was discharged.  The nausea/headache now gone but

## 2020-10-07 NOTE — TELEPHONE ENCOUNTER
Called and spoke to the patient. Says she feels well. Throat feels better. No more headache. Does have a very mild blurry vision she feels since yesterday. Denies eye pain. Discussed the uncertainty of why.  Doesn't sound like corenal abrasion whic

## 2020-10-07 NOTE — TELEPHONE ENCOUNTER
Schedulers:  Please assist with scheduling patient for ERCP in 3 months time per below orders from [facility].     Thank you

## 2020-10-12 NOTE — TELEPHONE ENCOUNTER
It should be general.    Thanks    Gigi Lawrence MD  [facility], [facility] - Gastroenterology  10/12/2020  9:10 AM

## 2020-10-13 NOTE — TELEPHONE ENCOUNTER
Scheduled for:  ERCP w/Fluroscopy 78486  Provider Name:  Dr. Nidia Colón  Date:  1/4/21  Location:    [facility]  Sedation:  General  Time:  8182 (pt is aware to arrive at 1130)   Prep:  Nothing to eat after midnight   Nothing to drink after 0900   Sent via [employer]

## 2021-01-01 ENCOUNTER — LAB ENCOUNTER (OUTPATIENT)
Dept: LAB | Facility: HOSPITAL | Age: 81
End: 2021-01-01
Attending: INTERNAL MEDICINE
Payer: MEDICARE

## 2021-01-01 DIAGNOSIS — Z01.818 PRE-OP TESTING: ICD-10-CM

## 2021-01-02 LAB — SARS-COV-2 RNA RESP QL NAA+PROBE: NOT DETECTED

## 2021-01-04 ENCOUNTER — TELEPHONE (OUTPATIENT)
Dept: GASTROENTEROLOGY | Facility: CLINIC | Age: 81
End: 2021-01-04

## 2021-01-04 ENCOUNTER — ANESTHESIA EVENT (OUTPATIENT)
Dept: ENDOSCOPY | Facility: HOSPITAL | Age: 81
End: 2021-01-04
Payer: MEDICARE

## 2021-01-04 ENCOUNTER — APPOINTMENT (OUTPATIENT)
Dept: GENERAL RADIOLOGY | Facility: HOSPITAL | Age: 81
End: 2021-01-04
Attending: INTERNAL MEDICINE
Payer: MEDICARE

## 2021-01-04 ENCOUNTER — HOSPITAL ENCOUNTER (OUTPATIENT)
Facility: HOSPITAL | Age: 81
Setting detail: HOSPITAL OUTPATIENT SURGERY
Discharge: HOME | End: 2021-01-04
Attending: INTERNAL MEDICINE | Admitting: INTERNAL MEDICINE
Payer: MEDICARE

## 2021-01-04 ENCOUNTER — ANESTHESIA (OUTPATIENT)
Dept: ENDOSCOPY | Facility: HOSPITAL | Age: 81
End: 2021-01-04
Payer: MEDICARE

## 2021-01-04 VITALS
OXYGEN SATURATION: 98 % | HEART RATE: 83 BPM | RESPIRATION RATE: 16 BRPM | WEIGHT: 140 LBS | DIASTOLIC BLOOD PRESSURE: 67 MMHG | BODY MASS INDEX: 27.48 KG/M2 | TEMPERATURE: 97 F | SYSTOLIC BLOOD PRESSURE: 132 MMHG | HEIGHT: 60 IN

## 2021-01-04 DIAGNOSIS — K83.1 BILIARY STRICTURE: Primary | ICD-10-CM

## 2021-01-04 DIAGNOSIS — Z01.818 PRE-OP TESTING: Primary | ICD-10-CM

## 2021-01-04 DIAGNOSIS — K83.1 BILIARY STRICTURE: ICD-10-CM

## 2021-01-04 PROCEDURE — 0F798DZ DILATION OF COMMON BILE DUCT WITH INTRALUMINAL DEVICE, VIA NATURAL OR ARTIFICIAL OPENING ENDOSCOPIC: ICD-10-PCS | Performed by: INTERNAL MEDICINE

## 2021-01-04 PROCEDURE — 0FC98ZZ EXTIRPATION OF MATTER FROM COMMON BILE DUCT, VIA NATURAL OR ARTIFICIAL OPENING ENDOSCOPIC: ICD-10-PCS | Performed by: INTERNAL MEDICINE

## 2021-01-04 PROCEDURE — 0FPB8DZ REMOVAL OF INTRALUMINAL DEVICE FROM HEPATOBILIARY DUCT, VIA NATURAL OR ARTIFICIAL OPENING ENDOSCOPIC: ICD-10-PCS | Performed by: INTERNAL MEDICINE

## 2021-01-04 PROCEDURE — 74330 X-RAY BILE/PANC ENDOSCOPY: CPT | Performed by: INTERNAL MEDICINE

## 2021-01-04 PROCEDURE — 43264 ERCP REMOVE DUCT CALCULI: CPT | Performed by: INTERNAL MEDICINE

## 2021-01-04 PROCEDURE — 43276 ERCP STENT EXCHANGE W/DILATE: CPT | Performed by: INTERNAL MEDICINE

## 2021-01-04 DEVICE — COTTON-LEUNG BILIARY STENT
Type: IMPLANTABLE DEVICE | Status: FUNCTIONAL
Brand: COTTON-LEUNG

## 2021-01-04 RX ORDER — ACETAMINOPHEN 325 MG/1
650 TABLET ORAL ONCE
Status: COMPLETED | OUTPATIENT
Start: 2021-01-04 | End: 2021-01-04

## 2021-01-04 RX ORDER — HALOPERIDOL 5 MG/ML
0.25 INJECTION INTRAMUSCULAR ONCE AS NEEDED
Status: DISCONTINUED | OUTPATIENT
Start: 2021-01-04 | End: 2021-01-04 | Stop reason: HOSPADM

## 2021-01-04 RX ORDER — PROCHLORPERAZINE EDISYLATE 5 MG/ML
5 INJECTION INTRAMUSCULAR; INTRAVENOUS ONCE AS NEEDED
Status: DISCONTINUED | OUTPATIENT
Start: 2021-01-04 | End: 2021-01-04 | Stop reason: HOSPADM

## 2021-01-04 RX ORDER — NALOXONE HYDROCHLORIDE 0.4 MG/ML
80 INJECTION, SOLUTION INTRAMUSCULAR; INTRAVENOUS; SUBCUTANEOUS AS NEEDED
Status: DISCONTINUED | OUTPATIENT
Start: 2021-01-04 | End: 2021-01-04 | Stop reason: HOSPADM

## 2021-01-04 RX ORDER — ROCURONIUM BROMIDE 10 MG/ML
INJECTION, SOLUTION INTRAVENOUS AS NEEDED
Status: DISCONTINUED | OUTPATIENT
Start: 2021-01-04 | End: 2021-01-04 | Stop reason: SURG

## 2021-01-04 RX ORDER — HYDROMORPHONE HYDROCHLORIDE 1 MG/ML
0.2 INJECTION, SOLUTION INTRAMUSCULAR; INTRAVENOUS; SUBCUTANEOUS EVERY 5 MIN PRN
Status: DISCONTINUED | OUTPATIENT
Start: 2021-01-04 | End: 2021-01-04 | Stop reason: HOSPADM

## 2021-01-04 RX ORDER — SODIUM CHLORIDE, SODIUM LACTATE, POTASSIUM CHLORIDE, CALCIUM CHLORIDE 600; 310; 30; 20 MG/100ML; MG/100ML; MG/100ML; MG/100ML
INJECTION, SOLUTION INTRAVENOUS CONTINUOUS
Status: DISCONTINUED | OUTPATIENT
Start: 2021-01-04 | End: 2021-01-04

## 2021-01-04 RX ORDER — MORPHINE SULFATE 10 MG/ML
6 INJECTION, SOLUTION INTRAMUSCULAR; INTRAVENOUS EVERY 10 MIN PRN
Status: DISCONTINUED | OUTPATIENT
Start: 2021-01-04 | End: 2021-01-04 | Stop reason: HOSPADM

## 2021-01-04 RX ORDER — HYDROCODONE BITARTRATE AND ACETAMINOPHEN 5; 325 MG/1; MG/1
1 TABLET ORAL AS NEEDED
Status: DISCONTINUED | OUTPATIENT
Start: 2021-01-04 | End: 2021-01-04 | Stop reason: HOSPADM

## 2021-01-04 RX ORDER — ONDANSETRON 2 MG/ML
4 INJECTION INTRAMUSCULAR; INTRAVENOUS ONCE AS NEEDED
Status: DISCONTINUED | OUTPATIENT
Start: 2021-01-04 | End: 2021-01-04 | Stop reason: HOSPADM

## 2021-01-04 RX ORDER — HYDROMORPHONE HYDROCHLORIDE 1 MG/ML
0.4 INJECTION, SOLUTION INTRAMUSCULAR; INTRAVENOUS; SUBCUTANEOUS EVERY 5 MIN PRN
Status: DISCONTINUED | OUTPATIENT
Start: 2021-01-04 | End: 2021-01-04 | Stop reason: HOSPADM

## 2021-01-04 RX ORDER — SODIUM CHLORIDE, SODIUM LACTATE, POTASSIUM CHLORIDE, CALCIUM CHLORIDE 600; 310; 30; 20 MG/100ML; MG/100ML; MG/100ML; MG/100ML
INJECTION, SOLUTION INTRAVENOUS CONTINUOUS
Status: DISCONTINUED | OUTPATIENT
Start: 2021-01-04 | End: 2021-01-04 | Stop reason: HOSPADM

## 2021-01-04 RX ORDER — LIDOCAINE HYDROCHLORIDE 10 MG/ML
INJECTION, SOLUTION EPIDURAL; INFILTRATION; INTRACAUDAL; PERINEURAL AS NEEDED
Status: DISCONTINUED | OUTPATIENT
Start: 2021-01-04 | End: 2021-01-04 | Stop reason: SURG

## 2021-01-04 RX ORDER — HYDROMORPHONE HYDROCHLORIDE 1 MG/ML
0.6 INJECTION, SOLUTION INTRAMUSCULAR; INTRAVENOUS; SUBCUTANEOUS EVERY 5 MIN PRN
Status: DISCONTINUED | OUTPATIENT
Start: 2021-01-04 | End: 2021-01-04 | Stop reason: HOSPADM

## 2021-01-04 RX ORDER — ONDANSETRON 2 MG/ML
INJECTION INTRAMUSCULAR; INTRAVENOUS AS NEEDED
Status: DISCONTINUED | OUTPATIENT
Start: 2021-01-04 | End: 2021-01-04 | Stop reason: SURG

## 2021-01-04 RX ORDER — GLYCOPYRROLATE 0.2 MG/ML
INJECTION, SOLUTION INTRAMUSCULAR; INTRAVENOUS AS NEEDED
Status: DISCONTINUED | OUTPATIENT
Start: 2021-01-04 | End: 2021-01-04 | Stop reason: SURG

## 2021-01-04 RX ORDER — MORPHINE SULFATE 4 MG/ML
2 INJECTION, SOLUTION INTRAMUSCULAR; INTRAVENOUS EVERY 10 MIN PRN
Status: DISCONTINUED | OUTPATIENT
Start: 2021-01-04 | End: 2021-01-04 | Stop reason: HOSPADM

## 2021-01-04 RX ORDER — HYDROCODONE BITARTRATE AND ACETAMINOPHEN 5; 325 MG/1; MG/1
2 TABLET ORAL AS NEEDED
Status: DISCONTINUED | OUTPATIENT
Start: 2021-01-04 | End: 2021-01-04 | Stop reason: HOSPADM

## 2021-01-04 RX ORDER — DEXAMETHASONE SODIUM PHOSPHATE 4 MG/ML
VIAL (ML) INJECTION AS NEEDED
Status: DISCONTINUED | OUTPATIENT
Start: 2021-01-04 | End: 2021-01-04 | Stop reason: SURG

## 2021-01-04 RX ORDER — MORPHINE SULFATE 4 MG/ML
4 INJECTION, SOLUTION INTRAMUSCULAR; INTRAVENOUS EVERY 10 MIN PRN
Status: DISCONTINUED | OUTPATIENT
Start: 2021-01-04 | End: 2021-01-04 | Stop reason: HOSPADM

## 2021-01-04 RX ADMIN — SODIUM CHLORIDE, SODIUM LACTATE, POTASSIUM CHLORIDE, CALCIUM CHLORIDE: 600; 310; 30; 20 INJECTION, SOLUTION INTRAVENOUS at 12:44:00

## 2021-01-04 RX ADMIN — LIDOCAINE HYDROCHLORIDE 50 MG: 10 INJECTION, SOLUTION EPIDURAL; INFILTRATION; INTRACAUDAL; PERINEURAL at 12:46:00

## 2021-01-04 RX ADMIN — ONDANSETRON 4 MG: 2 INJECTION INTRAMUSCULAR; INTRAVENOUS at 12:56:00

## 2021-01-04 RX ADMIN — GLYCOPYRROLATE 0.2 MG: 0.2 INJECTION, SOLUTION INTRAMUSCULAR; INTRAVENOUS at 12:45:00

## 2021-01-04 RX ADMIN — ROCURONIUM BROMIDE 5 MG: 10 INJECTION, SOLUTION INTRAVENOUS at 12:46:00

## 2021-01-04 RX ADMIN — DEXAMETHASONE SODIUM PHOSPHATE 4 MG: 4 MG/ML VIAL (ML) INJECTION at 12:56:00

## 2021-01-04 NOTE — ANESTHESIA PROCEDURE NOTES
Airway  Airway not difficult    General Information and Staff    Resident/CRNA: Bharat Liao, CRNA  Performed: CRNA     Indications and Patient Condition  Indications for airway management: anesthesia  Preoxygenated: yes  Patient position: sniff

## 2021-01-04 NOTE — OPERATIVE REPORT
Endoscopic retrograde cholangio-pancreatography (ERCP) report    Dick Howe     1940 Age [de-identified]year old   PCP No primary care provider on file.  Endoscopist Sabino Maria MD     Date of procedure: 21    Procedure: ERCP w/ stent exch sphincterotomy, which was otherwise unremarkable. The distal ends of the stents were distal in the 2nd/3rd portion of the duodenum.  The proximal end of each stent was removed from the bile duct with a grasper and then removed from the bile duct by placing diet as tolerated for 4 hours then advance to soft diet this evening and general diet tomorrow if feeling well  4. ERCP for stent exchange/removal in approximately 12 weeks  5.  Follow up in GI clinic     Discussed with patient's daughter, Winnie Zuniga over the

## 2021-01-04 NOTE — ANESTHESIA POSTPROCEDURE EVALUATION
Patient: Licha Clay    Procedure Summary     Date: 01/04/21 Room / Location: [facility] 01 / [facility] ENDOSCOPY    Anesthesia Start: 0759 Anesthesia Stop:     Procedure: ENDOSCOPIC RETROGRADE CHOLANGIOPANCREATOGRAPHY (ERCP) (N/A ) Diagnosis:       B

## 2021-01-04 NOTE — TELEPHONE ENCOUNTER
Discussed with patient's daughter over the phone after procedure the persistent stricture.  Discussed the risk of no further procedures including but not limited to biliary obstruction, cholangitis, etc. Discussed my concern that leaving it be will end up i

## 2021-01-04 NOTE — H&P
History & Physical Examination    Patient Name: Lis Brown  MRN: [id number]  CSN: [id number]  YOB: 1940    Diagnosis: biliary stricture      •  TURMERIC OR, Take 100 mg by mouth 2 (two) times a day., Disp: , Rfl: , 12/28/2020    •  E 40 MG/0.4ML Subcutaneous Solution, Inject 40 mg into the skin daily. , Disp: , Rfl:     •  omeprazole 20 MG Oral Capsule Delayed Release, Take 20 mg by mouth nightly., Disp: , Rfl:     •  vitamin E 400 UNITS Oral Cap, Take 400 Units by mouth daily. , Disp: , Currently    SYSTEM Check if Physical Exam is Normal If not normal, please explain:   HENEFTALI [ Noris Gaviria  [ Jalen Mode [ Yris Mediate [ Davinah Grass [ Marysol Oas [ X]      I have discussed the risks and benefits and alternatives of the procedure wi

## 2021-01-04 NOTE — TELEPHONE ENCOUNTER
Patient called the office. States she is at [facility] and would like to know where she should go to check in for her procedure today with Dr. Baldomero Anthony.  Per patient message from 10/14/20 provided patient with the following information:    Your appointment is on:

## 2021-01-04 NOTE — ANESTHESIA PREPROCEDURE EVALUATION
Anesthesia PreOp Note    HPI:     Dejuan Peralta is a [de-identified]year old female who presents for preoperative consultation requested by: Waylon Hanley MD    Date of Surgery: 1/4/2021    Procedure(s):  ENDOSCOPIC RETROGRADE CHOLANGIOPANCREATOGRAPHY (ERC PLACEMENT  06/01/2020   • ERCP DUCT STENT PLACEMENT  02/25/2020   • ERCP DUCT STENT PLACEMENT  10/05/2020   • ESOPHAGOGASTRODUODENOSCOPY (EGD) N/A 10/5/2020    Performed by Jay Montiel MD at [address] [place] [address] docusate sodium 100 MG Oral Cap, Take 100 mg by mouth 2 (two) times daily. , Disp: 60 capsule, Rfl: 0    •  Enoxaparin Sodium 40 MG/0.4ML Subcutaneous Solution, Inject 40 mg into the skin daily. , Disp: , Rfl:     •  omeprazole 20 MG Oral Capsule Delayed Rel file        Relationship status: Not on file      Intimate partner violence        Fear of current or ex partner: Not on file        Emotionally abused: Not on file        Physically abused: Not on file        Forced sexual activity: Not on file    Other T

## 2021-01-06 ENCOUNTER — TELEPHONE (OUTPATIENT)
Dept: GASTROENTEROLOGY | Facility: CLINIC | Age: 81
End: 2021-01-06

## 2021-01-06 NOTE — TELEPHONE ENCOUNTER
Called patient. Discussed results of bile duct stricture brushing were negative.     As discussed with patient's daughter after procedure the other day discussed could consider 2nd opinion though would likely recommend fully covered metal stent placement at

## 2021-01-11 NOTE — TELEPHONE ENCOUNTER
General    Thanks    Gloria Diamond MD  [facility] [place] - Gastroenterology  1/11/2021  8:56 AM

## 2021-01-11 NOTE — TELEPHONE ENCOUNTER
Scheduled for:  ERCP 80343 w/fluroscopy  Provider Name:  Dr. Maryam Romano  Date:  4/26/21  Location:    [facility]  Sedation:  General  Time:  1333 (pt is aware to arrive at 1130)   Prep:  Nothing to eat after midnight   Nothing to drink after 0900 the day of the pr

## 2021-01-28 ENCOUNTER — TELEPHONE (OUTPATIENT)
Dept: GASTROENTEROLOGY | Facility: CLINIC | Age: 81
End: 2021-01-28

## 2021-01-28 NOTE — TELEPHONE ENCOUNTER
Patient contacted and message from Dr. Kingston Martinez given. Names and phone number given [phone]  Patient voiced understanding.

## 2021-01-28 NOTE — TELEPHONE ENCOUNTER
Dr. Jean Pierre Foster or Dr. Oswaldo Vallecillo MD  [facility], [facility] - Gastroenterology  1/28/2021  1:38 PM

## 2021-02-01 ENCOUNTER — TELEPHONE (OUTPATIENT)
Dept: GASTROENTEROLOGY | Facility: CLINIC | Age: 81
End: 2021-02-01

## 2021-02-01 DIAGNOSIS — K83.1 BILIARY STRICTURE: ICD-10-CM

## 2021-02-01 DIAGNOSIS — K92.1 BLOOD IN THE STOOL: ICD-10-CM

## 2021-02-01 DIAGNOSIS — R74.8 ELEVATED LIVER ENZYMES: ICD-10-CM

## 2021-02-01 DIAGNOSIS — R10.11 RUQ ABDOMINAL PAIN: Primary | ICD-10-CM

## 2021-02-01 NOTE — TELEPHONE ENCOUNTER
Pt states she is having a pain underneath her rib since she had the stents put in on 1-4. The pain is there and get's worse after she eats and now she is having blood in her stool. .  Please advise

## 2021-02-01 NOTE — TELEPHONE ENCOUNTER
ERCP on 01/04/2021    Pt has had a discomfort underneath her ribs on the right side ever since the procedure. She also feels the pain in her shoulder blades    The pain gets worse after she eats. Pain level can be a 5-6/10.  It does not matter what she eats

## 2021-02-01 NOTE — TELEPHONE ENCOUNTER
I recommend CT A/P asap (ordered) and labs (CMP and CBC) ordered.     I would like to see her in the office Friday at 10:30 AM at the [facility]    If severe bleeding or pain or fever, chest pain, shortness of breath, dizziness/lightheadedness should present to t

## 2021-02-02 ENCOUNTER — HOSPITAL ENCOUNTER (OUTPATIENT)
Dept: CT IMAGING | Facility: HOSPITAL | Age: 81
Discharge: HOME | End: 2021-02-02
Attending: INTERNAL MEDICINE
Payer: MEDICARE

## 2021-02-02 ENCOUNTER — LAB ENCOUNTER (OUTPATIENT)
Dept: LAB | Facility: HOSPITAL | Age: 81
End: 2021-02-02
Attending: INTERNAL MEDICINE
Payer: MEDICARE

## 2021-02-02 DIAGNOSIS — R74.8 ELEVATED LIVER ENZYMES: ICD-10-CM

## 2021-02-02 DIAGNOSIS — R10.11 RUQ ABDOMINAL PAIN: ICD-10-CM

## 2021-02-02 DIAGNOSIS — K92.1 BLOOD IN THE STOOL: ICD-10-CM

## 2021-02-02 DIAGNOSIS — K83.1 BILIARY STRICTURE: ICD-10-CM

## 2021-02-02 LAB
ALBUMIN SERPL-MCNC: 3.1 G/DL (ref 3.4–5)
ALBUMIN/GLOB SERPL: 0.8 {RATIO} (ref 1–2)
ALP LIVER SERPL-CCNC: 190 U/L
ALT SERPL-CCNC: 13 U/L
ANION GAP SERPL CALC-SCNC: 2 MMOL/L (ref 0–18)
AST SERPL-CCNC: 16 U/L (ref 15–37)
BILIRUB SERPL-MCNC: 0.4 MG/DL (ref 0.1–2)
BUN BLD-MCNC: 21 MG/DL (ref 7–18)
BUN/CREAT SERPL: 30.4 (ref 10–20)
CALCIUM BLD-MCNC: 9.5 MG/DL (ref 8.5–10.1)
CHLORIDE SERPL-SCNC: 109 MMOL/L (ref 98–112)
CO2 SERPL-SCNC: 31 MMOL/L (ref 21–32)
CREAT BLD-MCNC: 0.69 MG/DL
GLOBULIN PLAS-MCNC: 4.1 G/DL (ref 2.8–4.4)
GLUCOSE BLD-MCNC: 106 MG/DL (ref 70–99)
M PROTEIN MFR SERPL ELPH: 7.2 G/DL (ref 6.4–8.2)
OSMOLALITY SERPL CALC.SUM OF ELEC: 297 MOSM/KG (ref 275–295)
PATIENT FASTING Y/N/NP: NO
POTASSIUM SERPL-SCNC: 4.3 MMOL/L (ref 3.5–5.1)
SODIUM SERPL-SCNC: 142 MMOL/L (ref 136–145)

## 2021-02-02 PROCEDURE — 36415 COLL VENOUS BLD VENIPUNCTURE: CPT

## 2021-02-02 PROCEDURE — 80053 COMPREHEN METABOLIC PANEL: CPT

## 2021-02-02 PROCEDURE — 74177 CT ABD & PELVIS W/CONTRAST: CPT | Performed by: INTERNAL MEDICINE

## 2021-02-02 RX ORDER — PANTOPRAZOLE SODIUM 40 MG/1
40 TABLET, DELAYED RELEASE ORAL
Qty: 30 TABLET | Refills: 0 | Status: SHIPPED | OUTPATIENT
Start: 2021-02-02

## 2021-02-02 NOTE — TELEPHONE ENCOUNTER
Patient contacted, verified and message from Dr. Ada Key given. Copy of CT results faxed to Dr. Garcia [facility] at [phone] with return confirmation. Phone [phone]    Patient states she is already taking Pantoprazole daily.     Appointment confirm

## 2021-02-02 NOTE — TELEPHONE ENCOUNTER
Pt is scheduled for her CT scan at [facility] today at 2:00 pm.    She was advised to arrive at [address] at 12:45 to drink the barium. NPO 2 hours prior to CT scan    She will then go get her CMP drawn.  I cancelled the CBC    Dr Natalia Jimenez office is fa

## 2021-02-02 NOTE — TELEPHONE ENCOUNTER
GI staff:    Please let her know the following:    I saw her hemoglobin from her primary physician's office from yesterday.  It is 10-11 which is low though the ones from last year which are the only other ones I have and this is higher than it was one year

## 2021-02-02 NOTE — TELEPHONE ENCOUNTER
This CBC was collected on 02/01/2021 @18:27    Pt was also given a prescription for metronidazole and levofloxacin yesterday by Dr Eagle Gonzalez for possible diverticulitis.     I told the pt to hold off on the antibiotics until Dr Behzad Barboza viewed the CT scan

## 2021-02-02 NOTE — TELEPHONE ENCOUNTER
[employer] ID [id number]    Lob  [phone]    I spoke to: Candelario Acharya, . Criteria has been met.     Auth # [id number]    Valid 02/-02/2021 through 03/04/2021

## 2021-02-03 ENCOUNTER — TELEPHONE (OUTPATIENT)
Dept: GASTROENTEROLOGY | Facility: CLINIC | Age: 81
End: 2021-02-03

## 2021-02-03 NOTE — TELEPHONE ENCOUNTER
Medical records received from [facility] and placed on Dr. Georgina ramirez for review.

## 2021-02-04 ENCOUNTER — TELEPHONE (OUTPATIENT)
Dept: GASTROENTEROLOGY | Facility: CLINIC | Age: 81
End: 2021-02-04

## 2021-02-04 NOTE — TELEPHONE ENCOUNTER
Patient contacted and appointment made for 02/09/2021 at 9:00 am per Dr. Yolis Morgan. Patient advised to come 15 minutes early. Address for [facility] location given. Appointment for tomorrow cancelled. Patient voiced understanding.

## 2021-02-04 NOTE — TELEPHONE ENCOUNTER
If she can't come in tomorrow and clinically doing ok (I.e. can wait till next week)I can see her 9 AM Tuesday at [facility]. I think in visit would be most appropriate for the issues we are trying to evaluate (bleeding and pain).     Louis Henley MD

## 2021-02-04 NOTE — TELEPHONE ENCOUNTER
Patient states she is unable to come to the office tomorrow due to the snow. Daughter lives all the way in [address] [place] [address] and patient lives in [facility]. Patient asking if she can have a phone visit or video visit instead please.   No appointments available nex

## 2021-02-05 NOTE — TELEPHONE ENCOUNTER
Noted    Sending for scanning.     Silvina Powell MD  [facility], [facility] - Gastroenterology  2/5/2021  10:39 AM

## 2021-02-08 NOTE — PROGRESS NOTES
[facility], [facility] - Gastroenterology                                                                                                  Clinic Progress Note    Patient pr ENDOSCOPY   • ENDOSCOPIC RETROGRADE CHOLANGIOPANCREATOGRAPHY (ERCP) N/A 6/1/2020    Performed by Brendon Cook MD at [address] [place] [address] (ERCP) N/A 2/26/2020    Performed by Brendon Cook MD at [address] [place] [address] MG/0.4ML Subcutaneous Solution Inject 40 mg into the skin daily. • Alum & Mag Hydroxide-Simeth 200-229-12 MG/5ML Oral Suspension Take 15 mL by mouth 4 (four) times daily as needed for Indigestion.      • omeprazole 20 MG Oral Capsule Delayed Release Joe Escalona gastroenterology/hepatology at [address] [place] in July noting a prior colonoscopy 5-7 years prior at [facility] for screening and found to have diverticulosis and no polyps.  The notes also indicate multiple prior EGDs for GERD at [facility] without pathology reported hypertension appears to be chronic given the noted concern from Dr. Jacqueline Monroy 5 years ago.  Also discussed would recommend following up with [facility] hepatology who she saw in July 2020 or with Dr. Angelika Tinsley here.     Recommend:  -EGD/colonoscopy with MAC at the [address] [place] [address] encounter.     Meds This Visit:  Requested Prescriptions      No prescriptions requested or ordered in this encounter     Imaging & Referrals:  None     Adama Bedoya MD  [facility], [facility] - Gastroenterology  2/9/2021

## 2021-02-09 ENCOUNTER — OFFICE VISIT (OUTPATIENT)
Dept: GASTROENTEROLOGY | Facility: CLINIC | Age: 81
End: 2021-02-09
Payer: MEDICARE

## 2021-02-09 ENCOUNTER — TELEPHONE (OUTPATIENT)
Dept: GASTROENTEROLOGY | Facility: CLINIC | Age: 81
End: 2021-02-09

## 2021-02-09 VITALS
DIASTOLIC BLOOD PRESSURE: 66 MMHG | HEART RATE: 78 BPM | SYSTOLIC BLOOD PRESSURE: 128 MMHG | WEIGHT: 144.38 LBS | BODY MASS INDEX: 28 KG/M2

## 2021-02-09 DIAGNOSIS — Z87.19 HISTORY OF GI BLEED: ICD-10-CM

## 2021-02-09 DIAGNOSIS — K62.5 RECTAL BLEEDING: Primary | ICD-10-CM

## 2021-02-09 DIAGNOSIS — R10.11 RUQ PAIN: ICD-10-CM

## 2021-02-09 DIAGNOSIS — K83.1 BILIARY STRICTURE: ICD-10-CM

## 2021-02-09 DIAGNOSIS — R10.11 RUQ ABDOMINAL PAIN: ICD-10-CM

## 2021-02-09 DIAGNOSIS — K62.5 BLOOD PER RECTUM: Primary | ICD-10-CM

## 2021-02-09 PROCEDURE — 99215 OFFICE O/P EST HI 40 MIN: CPT | Performed by: INTERNAL MEDICINE

## 2021-02-09 PROCEDURE — 3078F DIAST BP <80 MM HG: CPT | Performed by: INTERNAL MEDICINE

## 2021-02-09 PROCEDURE — 3074F SYST BP LT 130 MM HG: CPT | Performed by: INTERNAL MEDICINE

## 2021-02-09 RX ORDER — SODIUM, POTASSIUM,MAG SULFATES 17.5-3.13G
SOLUTION, RECONSTITUTED, ORAL ORAL
Qty: 1 BOTTLE | Refills: 0 | Status: ON HOLD | OUTPATIENT
Start: 2021-02-09 | End: 2021-03-15

## 2021-02-09 RX ORDER — ESZOPICLONE 3 MG/1
3 TABLET, FILM COATED ORAL NIGHTLY
COMMUNITY

## 2021-02-09 RX ORDER — METRONIDAZOLE 500 MG/1
500 TABLET ORAL EVERY 8 HOURS
COMMUNITY
Start: 2021-02-01 | End: 2021-03-10 | Stop reason: ALTCHOICE

## 2021-02-09 RX ORDER — LEVOFLOXACIN 500 MG/1
500 TABLET, FILM COATED ORAL DAILY
COMMUNITY
Start: 2021-02-01 | End: 2021-03-10 | Stop reason: ALTCHOICE

## 2021-02-09 NOTE — TELEPHONE ENCOUNTER
Scheduled for:   Colonoscopy [phone] ,[address]  Provider Name:  Matthew Morales  Date:  3/15/21  Location:  [facility]  Sedation:  Mac  Time:  1500 , (pt is aware to arrive at 1400)  Prep:  Golytely or Trilyte , Prep instructions were given to pt in the office, pt ve

## 2021-02-09 NOTE — PATIENT INSTRUCTIONS
1. Schedule colonoscopy and upper endoscopy with monitored anesthesia care (MAC) at the [facility] or the hospital    2.  bowel prep from pharmacy (split suprep).  As we discussed it is important to take the bowel preparation in two parts taking 1 bottle o

## 2021-02-17 ENCOUNTER — PATIENT MESSAGE (OUTPATIENT)
Dept: GASTROENTEROLOGY | Facility: CLINIC | Age: 81
End: 2021-02-17

## 2021-02-18 ENCOUNTER — PATIENT MESSAGE (OUTPATIENT)
Dept: GASTROENTEROLOGY | Facility: CLINIC | Age: 81
End: 2021-02-18

## 2021-02-18 NOTE — TELEPHONE ENCOUNTER
From: Félix Morrison  To: Niya Montelongo MD  Sent: 2/18/2021 1:30 PM CST  Subject: Non-Urgent Medical Question    Will I be required to take a Covid 19 test prior to my procedure?

## 2021-02-18 NOTE — TELEPHONE ENCOUNTER
To: [facility] CLINICAL STAFF      From: Sherry Jones      Created: 2/18/2021 12:46 PM          On 2/9/2021   Doctor Wade Smith offered to schedule a  2nd Opinion for Bile Duct at [facility].  Since that conversation, I have not received any ap physician though I don't believe it is worrisome. I also discussed there is note of portal hypertension.     Notably she also does have some records with her from [place].  [place] which I've reviewed including EGD with Dr. Jared Sheldon from 4/14/2016 for indication of ab

## 2021-02-18 NOTE — TELEPHONE ENCOUNTER
I contacted [facility] who contacted the patient/family and scheduled her    Barb Wood MD  [facility], [facility] - Gastroenterology  2/18/2021  4:32 PM

## 2021-02-18 NOTE — TELEPHONE ENCOUNTER
From: Lucie Valiente  To:  Anand Herrera MD  Sent: 2/18/2021 12:46 PM CST  Subject: Other    On 2/9/2021 Doctor Anand Herrera offered to schedule a 2nd Opinion for Bile Duct at [facility], [facility]. Since that conversation, I have not received any ap

## 2021-02-18 NOTE — TELEPHONE ENCOUNTER
From: Félix Morrison  To: Niya Montelongo MD  Sent: 2/17/2021 7:14 PM CST  Subject: Non-Urgent Medical Question    I am scheduled for Dr. Niya Montelongo on March 15th in endoscopy . What procedure is going to be preformed?

## 2021-02-20 NOTE — TELEPHONE ENCOUNTER
Refer to TE in [facility] from [facility] from 2/18/2021 for more information, pt has been scheduled with Candace Ortiz.

## 2021-02-24 ENCOUNTER — TELEPHONE (OUTPATIENT)
Dept: GASTROENTEROLOGY | Facility: CLINIC | Age: 81
End: 2021-02-24

## 2021-02-24 DIAGNOSIS — K83.1 BILIARY STRICTURE: Primary | ICD-10-CM

## 2021-02-24 NOTE — TELEPHONE ENCOUNTER
Yes, ok to cancel her ERCP here.     Thanks    Piotr Mahajan MD  [facility], [facility] - Gastroenterology  2/24/2021  3:07 PM

## 2021-02-24 NOTE — TELEPHONE ENCOUNTER
Patient calling to verify date of colonoscopy/EGD. I advised patient her procedure is scheduled for  03/15/2021 at 3:00 pm with arrival time of 2:00 pm.    Patient also asking if ERCP should be cancelled on 04/26/2021 with Dr. Giovanna Davenport.   Patient states duct at the time of her endometrial cancer diagnosis 20 years ago. Transferred to pre-registration at 0-2317.

## 2021-03-01 ENCOUNTER — TELEPHONE (OUTPATIENT)
Dept: GASTROENTEROLOGY | Facility: CLINIC | Age: 81
End: 2021-03-01

## 2021-03-01 DIAGNOSIS — Z87.19 HISTORY OF GI BLEED: ICD-10-CM

## 2021-03-01 DIAGNOSIS — K62.5 RECTAL BLEEDING: Primary | ICD-10-CM

## 2021-03-01 DIAGNOSIS — R10.11 RIGHT UPPER QUADRANT ABDOMINAL PAIN: ICD-10-CM

## 2021-03-01 NOTE — TELEPHONE ENCOUNTER
Most of the time these types of procedures are done separately. It would be quite a lengthy procedure to do this all together and I think it would be best to give the doctor and Liliya the dedicated time at her ERCP procedure to focus on this for her.  Alexandra Ritchie

## 2021-03-01 NOTE — TELEPHONE ENCOUNTER
Dr Beatrice Choudhury,    I spoke to the pt and she is wondering why you can't just do the ERCP when you do the Colon/EGD.     She doesn't want to have 2 separate procedures    She is scheduled on 03/15/21 for the colon/EGD

## 2021-03-02 NOTE — TELEPHONE ENCOUNTER
Patient contacted, verified and message from Dr. Low Oliver given. Patient states she would like to speak with Dr. Low Oliver directly. Patient advised Dr. Low Oliver is not in the office today but I will route message to him.  Thank you

## 2021-03-03 NOTE — TELEPHONE ENCOUNTER
I called and discussed with the patient. She had some questions about the procedure Dr. Galo Fleming is performing soon which we confirmed. Appears to also be performing EUS and likely EGD prior.      Discussed we can skip the EGD with me then and just plan for

## 2021-03-04 NOTE — TELEPHONE ENCOUNTER
Rescheduled for:   From - Colonoscopy [id number] and [phone]   To - Colonoscopy  Provider Name:  Keiko Bruno  Date:  3/15/21  Location:  [facility]  Sedation:  Mac  Time:  1500 , (pt is aware to arrive at 1400)  Prep:  Suprep , Prep instructions were given to pt in

## 2021-03-12 ENCOUNTER — LAB ENCOUNTER (OUTPATIENT)
Dept: LAB | Facility: HOSPITAL | Age: 81
End: 2021-03-12
Attending: INTERNAL MEDICINE
Payer: MEDICARE

## 2021-03-12 DIAGNOSIS — Z01.818 PRE-OP TESTING: ICD-10-CM

## 2021-03-12 LAB — SARS-COV-2 RNA RESP QL NAA+PROBE: NOT DETECTED

## 2021-03-15 ENCOUNTER — ANESTHESIA EVENT (OUTPATIENT)
Dept: ENDOSCOPY | Facility: HOSPITAL | Age: 81
End: 2021-03-15
Payer: MEDICARE

## 2021-03-15 ENCOUNTER — ANESTHESIA (OUTPATIENT)
Dept: ENDOSCOPY | Facility: HOSPITAL | Age: 81
End: 2021-03-15
Payer: MEDICARE

## 2021-03-15 ENCOUNTER — HOSPITAL ENCOUNTER (OUTPATIENT)
Facility: HOSPITAL | Age: 81
Setting detail: HOSPITAL OUTPATIENT SURGERY
Discharge: HOME | End: 2021-03-15
Attending: INTERNAL MEDICINE | Admitting: INTERNAL MEDICINE
Payer: MEDICARE

## 2021-03-15 VITALS
WEIGHT: 140 LBS | SYSTOLIC BLOOD PRESSURE: 113 MMHG | OXYGEN SATURATION: 100 % | BODY MASS INDEX: 27.48 KG/M2 | RESPIRATION RATE: 16 BRPM | DIASTOLIC BLOOD PRESSURE: 68 MMHG | HEIGHT: 60 IN | HEART RATE: 85 BPM

## 2021-03-15 DIAGNOSIS — K62.5 RECTAL BLEEDING: ICD-10-CM

## 2021-03-15 DIAGNOSIS — Z87.19 HISTORY OF GI BLEED: ICD-10-CM

## 2021-03-15 DIAGNOSIS — Z01.818 PRE-OP TESTING: Primary | ICD-10-CM

## 2021-03-15 DIAGNOSIS — R10.11 RUQ ABDOMINAL PAIN: ICD-10-CM

## 2021-03-15 PROCEDURE — 45380 COLONOSCOPY AND BIOPSY: CPT | Performed by: INTERNAL MEDICINE

## 2021-03-15 PROCEDURE — 0DBK8ZX EXCISION OF ASCENDING COLON, VIA NATURAL OR ARTIFICIAL OPENING ENDOSCOPIC, DIAGNOSTIC: ICD-10-PCS | Performed by: INTERNAL MEDICINE

## 2021-03-15 PROCEDURE — 0DBL8ZX EXCISION OF TRANSVERSE COLON, VIA NATURAL OR ARTIFICIAL OPENING ENDOSCOPIC, DIAGNOSTIC: ICD-10-PCS | Performed by: INTERNAL MEDICINE

## 2021-03-15 PROCEDURE — 45385 COLONOSCOPY W/LESION REMOVAL: CPT | Performed by: INTERNAL MEDICINE

## 2021-03-15 PROCEDURE — 0DBH8ZX EXCISION OF CECUM, VIA NATURAL OR ARTIFICIAL OPENING ENDOSCOPIC, DIAGNOSTIC: ICD-10-PCS | Performed by: INTERNAL MEDICINE

## 2021-03-15 RX ORDER — SODIUM CHLORIDE, SODIUM LACTATE, POTASSIUM CHLORIDE, CALCIUM CHLORIDE 600; 310; 30; 20 MG/100ML; MG/100ML; MG/100ML; MG/100ML
INJECTION, SOLUTION INTRAVENOUS CONTINUOUS
Status: DISCONTINUED | OUTPATIENT
Start: 2021-03-15 | End: 2021-03-15

## 2021-03-15 RX ADMIN — SODIUM CHLORIDE, SODIUM LACTATE, POTASSIUM CHLORIDE, CALCIUM CHLORIDE: 600; 310; 30; 20 INJECTION, SOLUTION INTRAVENOUS at 15:22:00

## 2021-03-15 RX ADMIN — SODIUM CHLORIDE, SODIUM LACTATE, POTASSIUM CHLORIDE, CALCIUM CHLORIDE: 600; 310; 30; 20 INJECTION, SOLUTION INTRAVENOUS at 16:02:00

## 2021-03-15 NOTE — H&P
History & Physical Examination    Patient Name: Mathieu Cordero  MRN: [id number]  CSN: [id number]  YOB: 1940    Diagnosis: History of GI bleeding, rectal bleeding    NON FORMULARY, Active enzyme, Disp: , Rfl: , 3/8/2021  Ascorbic Acid (VI Take by mouth., Disp: , Rfl: , not currently taking  docusate sodium 100 MG Oral Cap, Take 100 mg by mouth 2 (two) times daily.  (Patient not taking: Reported on 2/9/2021 ), Disp: 60 capsule, Rfl: 0, not currently taking  omeprazole 20 MG Oral Capsule Delay DUCT STENT PLACEMENT  02/25/2020   • ERCP DUCT STENT PLACEMENT  10/05/2020   • ERCP DUCT STENT PLACEMENT  01/04/2021   • ESOPHAGOGASTRODUODENOSCOPY (EGD) N/A 10/5/2020    Performed by Polina Goldstein MD at [address] [place] [address]

## 2021-03-15 NOTE — ANESTHESIA PREPROCEDURE EVALUATION
Anesthesia PreOp Note    HPI:     Re Rees is a [de-identified]year old female who presents for preoperative consultation requested by: Toma Estrada MD    Date of Surgery: 3/15/2021    Procedure(s):  COLONOSCOPY  Indication: Rectal bleeding, History ENDOSCOPY   • ENDOSCOPIC ULTRASOUND (EUS) N/A 10/5/2020    Performed by Bev Luo MD at [address] [place] [address] ENDOSCOPY   • ERCP DUCT STENT PLACEMENT  06/01/2020   • ERCP DUCT STENT PLACEMENT  02/25/2020   • ERCP DUCT STENT PLACEMENT  10/05/2020   • ERCP DUCT STEN 3/8/2021  NON FORMULARY, Triple Flex Joint supplement, Disp: , Rfl: , 3/8/2021  Cyanocobalamin (VITAMIN B 12 OR), Take by mouth., Disp: , Rfl: , 3/8/2021  Flaxseed, Linseed, (FLAX SEED OIL OR), Take by mouth., Disp: , Rfl: , 3/8/2021  Probiotic Product (IA Concerns:        Not on file    Social History Narrative      Not on file    Social Determinants of Health  Financial Resource Strain:       Difficulty of Paying Living Expenses:   Food Insecurity:       Worried About [address] in the Last Year: negative ROS   Abdominal                Anesthesia Plan:   ASA:  2  Plan:   MAC  Informed Consent Plan and Risks Discussed With:  Patient  Discussed plan with:  Surgeon      I have informed Lisa Folk and/or legal guardian or family member of the

## 2021-03-15 NOTE — ANESTHESIA POSTPROCEDURE EVALUATION
Patient: Bonne Band    Procedure Summary     Date: 03/15/21 Room / Location: [address] [place] [address] ENDOSCOPY 01 / [address] [place] [address] ENDOSCOPY    Anesthesia Start: 8965 Anesthesia Stop: 7707    Procedure: COLONOSCOPY (N/A ) Diagnosis:       Rectal bleeding      History of GI blee

## 2021-03-15 NOTE — OPERATIVE REPORT
Colonoscopy Report    Alejandra Manzanares     1940 Age [de-identified]year old   PCP No primary care provider on file.  Endoscopist Ariel Polanco MD     Date of procedure: 03/15/21    Procedure: Colonoscopy w/ biopsy and snare polypectomy    Pre-operative immediate postoperative complications. The patient’s vital signs were monitored throughout the procedure and remained stable.     Estimated blood loss: insignificant    Specimens collected: colon polyps    Complications: none     Colonoscopy findings:    Ce

## 2021-03-17 ENCOUNTER — TELEPHONE (OUTPATIENT)
Dept: GASTROENTEROLOGY | Facility: CLINIC | Age: 81
End: 2021-03-17

## 2021-03-17 NOTE — TELEPHONE ENCOUNTER
I mailed out colonoscopy results letter to pt  Updated health maintenance  Entered into 3 yr CLN recall  Recall colon in 3 years per.  Colon done 3/15/2021    Lissett Klein MD  [facility] Clinical Staff  GI staff: please place recall for colonoscopy in 3

## 2021-03-24 ENCOUNTER — PATIENT MESSAGE (OUTPATIENT)
Dept: GASTROENTEROLOGY | Facility: CLINIC | Age: 81
End: 2021-03-24

## 2021-03-24 NOTE — TELEPHONE ENCOUNTER
Patient states she received a text message about an upcoming appointment but is not sure for which doctor. No message or appointment found in our system. No further questions at this time.

## 2021-03-24 NOTE — TELEPHONE ENCOUNTER
From: Daphnie Alberto  To:  Adama Bedoya MD  Sent: 3/24/2021 2:46 PM CDT  Subject: Visit Follow-up Question    I don’t have any upcoming appointments with Sheryl Mcwilliams

## 2021-04-02 ENCOUNTER — TELEPHONE (OUTPATIENT)
Dept: GASTROENTEROLOGY | Facility: CLINIC | Age: 81
End: 2021-04-02

## 2021-04-02 NOTE — TELEPHONE ENCOUNTER
Dr Lawrence Lacy,    I have placed the results from the patient's ERCP completed at [facility] on your desk for review.

## 2021-04-03 NOTE — TELEPHONE ENCOUNTER
Reviewed.     Sent for scanning    Mona Vazquez MD  [address] [place] - Gastroenterology  4/3/2021  4:01 PM

## 2021-05-21 NOTE — TELEPHONE ENCOUNTER
CBLM to schedule procedure.  Please transfer to [facility] in GI
CBLM to schedule procedure.  Please transfer to [facility] in GI
CBLM to schedule procedure. Please transfer to [facility] in GI     3 attempts were made to contact this patient to schedule a procedure.     I sent a \"no response\" letter out today
Dr. Maryam Romano--    Please advise if this is an urgent case since you are booked out until May.  Thank you
GI -     Please note Dr. Kiesha Hadley ERCP operative report from 2/26/2020 recommendations while patient was inpatient: \"Repeat ERCP with stent exchange/removal further evaluation of the biliary stricture pending path/cyto results - timing to be determined
GI staff:    Patient currently hospitalized. Please call her at some point perhaps next week to schedule ERCP with general anesthesia at the hospital for a biliary stricture.      She will need to be off her enoxaparin for 24 hours before the test. I believ
I looked into her encounters and found that she had the procedure while she was in the hospital on 2/26/20.      Closing this TE
It is not urgent, but looking at the schedule I think April 6th is a good date.     Thanks    García Brady MD  [facility] - Gastroenterology  3/2/2020  12:28 PM
Routed to GI schedulers, thank you.
PRINCIPAL PROCEDURE  Procedure: Abdominoplasty, open  Findings and Treatment:       SECONDARY PROCEDURE  Procedure: Mastopexy, bilateral  Findings and Treatment: with reduction/removal of breast tissue on RIGHT

## 2021-08-11 ENCOUNTER — TELEPHONE (OUTPATIENT)
Dept: GASTROENTEROLOGY | Facility: CLINIC | Age: 81
End: 2021-08-11

## 2021-08-11 NOTE — TELEPHONE ENCOUNTER
Dr. Ebenezer Reyes,    Fax received from [facility] with notes from EUS/ERCP. Operative notes can be found in [address] [place] [address]. Please review, thank you.

## 2023-12-18 ENCOUNTER — TELEPHONE (OUTPATIENT)
Facility: CLINIC | Age: 83
End: 2023-12-18

## 2023-12-18 NOTE — TELEPHONE ENCOUNTER
Patient outreach message received:    Entered into 3 yr CLN recall  Recall colon in 3 years per. Colon done 3/15/2021    Recall reminder letter mailed out to patient.

## 2024-01-17 ENCOUNTER — TELEPHONE (OUTPATIENT)
Facility: CLINIC | Age: 84
End: 2024-01-17

## 2024-01-17 NOTE — TELEPHONE ENCOUNTER
Dr. Bearden,    Patient is due recall colonoscopy. She has been seeing [facility] but asking if she can have the colonoscopy done with you.     Please advise then I can contact her to review her medications and discuss any symptoms she may be having, thank you.

## 2024-01-17 NOTE — TELEPHONE ENCOUNTER
Patient calling to request if colonoscopy can be done with Dr. ARANDA instead of [facility]. Patient indicates her pcp will or can provide referral. Please update patient at [phone],thanks.

## 2024-01-18 NOTE — TELEPHONE ENCOUNTER
I would let her know, we can certainly plan for colonoscopy if she strongly prefers though I would encourage non-urgently discussing in the office if she is ok with this first to discuss prior findings and see how she is doing first prior to scheduling since it has been a few years since I've seen her.     Thanks    [facility]  1/18/2024  2:04 PM

## 2024-01-18 NOTE — TELEPHONE ENCOUNTER
Spoke to patient and reviewed note below. I scheduled patient for an appt. Location/date/time details provided.     Your appointments       Date & Time Appointment Department (Center)    Mar 21, 2024  5:00 PM CDT Follow Up Visit with Russel Bearden MD [facility] ([facility])

## 2024-06-25 ENCOUNTER — TELEPHONE (OUTPATIENT)
Facility: CLINIC | Age: 84
End: 2024-06-25

## 2024-06-25 ENCOUNTER — OFFICE VISIT (OUTPATIENT)
Facility: CLINIC | Age: 84
End: 2024-06-25

## 2024-06-25 VITALS
SYSTOLIC BLOOD PRESSURE: 116 MMHG | BODY MASS INDEX: 27.83 KG/M2 | WEIGHT: 163 LBS | DIASTOLIC BLOOD PRESSURE: 62 MMHG | HEIGHT: 64 IN | HEART RATE: 76 BPM

## 2024-06-25 DIAGNOSIS — Z12.11 SCREENING FOR COLORECTAL CANCER: Primary | ICD-10-CM

## 2024-06-25 DIAGNOSIS — Z86.010 ENCOUNTER FOR COLONOSCOPY DUE TO HISTORY OF ADENOMATOUS COLONIC POLYPS: ICD-10-CM

## 2024-06-25 DIAGNOSIS — Z12.11 ENCOUNTER FOR COLONOSCOPY DUE TO HISTORY OF ADENOMATOUS COLONIC POLYPS: ICD-10-CM

## 2024-06-25 DIAGNOSIS — Z12.11 COLON CANCER SCREENING: Primary | ICD-10-CM

## 2024-06-25 DIAGNOSIS — Z86.010 PERSONAL HISTORY OF COLONIC POLYPS: ICD-10-CM

## 2024-06-25 DIAGNOSIS — Z12.12 SCREENING FOR COLORECTAL CANCER: Primary | ICD-10-CM

## 2024-06-25 PROBLEM — K21.9 GASTRO-ESOPHAGEAL REFLUX DISEASE WITHOUT ESOPHAGITIS: Status: ACTIVE | Noted: 2020-02-19

## 2024-06-25 PROCEDURE — 3008F BODY MASS INDEX DOCD: CPT | Performed by: INTERNAL MEDICINE

## 2024-06-25 PROCEDURE — 1159F MED LIST DOCD IN RCRD: CPT | Performed by: INTERNAL MEDICINE

## 2024-06-25 PROCEDURE — 3074F SYST BP LT 130 MM HG: CPT | Performed by: INTERNAL MEDICINE

## 2024-06-25 PROCEDURE — 3078F DIAST BP <80 MM HG: CPT | Performed by: INTERNAL MEDICINE

## 2024-06-25 PROCEDURE — 1160F RVW MEDS BY RX/DR IN RCRD: CPT | Performed by: INTERNAL MEDICINE

## 2024-06-25 PROCEDURE — 1126F AMNT PAIN NOTED NONE PRSNT: CPT | Performed by: INTERNAL MEDICINE

## 2024-06-25 RX ORDER — SODIUM, POTASSIUM,MAG SULFATES 17.5-3.13G
SOLUTION, RECONSTITUTED, ORAL ORAL
Qty: 1 EACH | Refills: 0 | Status: SHIPPED | OUTPATIENT
Start: 2024-06-25

## 2024-06-25 RX ORDER — METOPROLOL SUCCINATE 25 MG/1
TABLET, EXTENDED RELEASE ORAL
COMMUNITY
Start: 2021-07-14

## 2024-06-25 RX ORDER — MIRABEGRON 25 MG/1
25 TABLET, FILM COATED, EXTENDED RELEASE ORAL DAILY
COMMUNITY
Start: 2024-05-31

## 2024-06-25 RX ORDER — SUCRALFATE 1 G/1
TABLET ORAL
COMMUNITY
Start: 2024-03-28

## 2024-06-25 RX ORDER — GABAPENTIN 100 MG/1
CAPSULE ORAL
COMMUNITY

## 2024-06-25 RX ORDER — TRAMADOL HYDROCHLORIDE 50 MG/1
TABLET ORAL
COMMUNITY

## 2024-06-25 RX ORDER — BENZONATATE 100 MG/1
CAPSULE ORAL
COMMUNITY
Start: 2024-06-17

## 2024-06-25 NOTE — TELEPHONE ENCOUNTER
Scheduled for: Colonoscopy [id number]   Provider Name: Dr Bearden    Date: Mon 9/16/2024   Location: [facility]  Sedation: MAC   Time: 12:30 pm   Prep: split suprep  Meds/Allergies Reconciled?: reviewed     Diagnosis with codes: colon screening Z12.11, Hx colon polyps Z86.010   Was patient informed to call insurance with codes (Y/N): Yes   Referral sent?: Yes, [employer]   [facility] or [facility] notified?: I sent an electronic request to Endo Scheduling and received a confirmation today.     Medication Orders: Patient is aware to NOT take iron pills, herbal meds and diet supplements for 7 days before exam. Also to NOT take any form of alcohol, recreational drugs and any forms of ED meds 24-72 hours before exam.      Misc Orders:       Further instructions given by staff: Instructions given in office and pt verbalized understanding        Instructions and Information about Your Health    1. Schedule colonoscopy with MAC at the hospital on a Monday Aug/Sept     2.  bowel prep from pharmacy (split suprep) - please read attached/given instructions very carefully      3. Medication   Continue all medications for procedure     4. Read all bowel prep instructions carefully

## 2024-06-25 NOTE — PATIENT INSTRUCTIONS
1. Schedule colonoscopy with MAC at the hospital on a Monday Aug/Sept    2.  bowel prep from pharmacy (split suprep) - please read attached/given instructions very carefully     3. Medication   Continue all medications for procedure    4. Read all bowel prep instructions carefully    5. AVOID seeds, nuts, popcorn, raw fruits and vegetables (cooked is okay) for 2-3 days before procedure    >>>Please note: if you were prescribed a bowel prep and it is too expensive or not covered by insurance, it is okay to substitute Trilyte or Golytely (or any similar generic prep). This can be done by notifying the pharmacy or calling our office.

## 2024-06-25 NOTE — H&P
[facility] - Gastroenterology                                                                                                  Clinic History and Physical         Chief Complaint   Patient presents with    Follow - Up     HPI:   Liliya Velasquez is a 84 year old  woman with history of BMI 27, cholecystectomy, appendectomy, hysterectomy, prior tobacco use, arthritis, esophageal reflux, endometrial cancer, here for follow up    She is here today to discuss colonoscopy.  Says she is worried about developing colon cancer in the future because of the precancerous polyp she had a few years ago.  Denies any current abdominal or gastrointestinal issues including abdominal pain, rectal bleeding, unintentional weight loss.  Says her health has been doing well otherwise.  Does have a cardiologist at [place] and planned echocardiogram in July.    History, Medications, Allergies, ROS:      Past Medical History:    Anemia    Arthritis    Diverticulitis    Endometrial cancer (HCC)    Esophageal reflux    Exposure to medical diagnostic radiation    High cholesterol    Personal history of antineoplastic chemotherapy    Visual impairment      Past Surgical History:   Procedure Laterality Date    Appendectomy      Cholecystectomy      Colonoscopy N/A 3/15/2021    Procedure: COLONOSCOPY;  Surgeon: Russel Bearden MD;  Location: [facility] ENDOSCOPY    Ercp duct stent placement  06/01/2020    Ercp duct stent placement  02/25/2020    Ercp duct stent placement  10/05/2020    Ercp duct stent placement  01/04/2021    Ercp duct stent placement  04/02/2021    @[facility]    Fracture surgery      Hysterectomy      Removal gallbladder        Family Hx: No family history on file.   Social History:   Social History     Socioeconomic History    Marital status:    Tobacco Use    Smoking status: Former     Current packs/day: 0.00     Types: Cigarettes      Quit date: 1975     Years since quittin.5    Smokeless tobacco: Never   Vaping Use    Vaping status: Never Used   Substance and Sexual Activity    Alcohol use: Not Currently    Drug use: Never     Social Determinants of Health      Received from [facility]    Social Connections    Received from [facility]    Housing Stability        Medications (Active prior to today's visit):  Current Outpatient Medications   Medication Sig Dispense Refill    NON FORMULARY Active enzyme      Ascorbic Acid (VITAMIN C OR) Take by mouth.      Eszopiclone (LUNESTA) 3 MG Oral Tab Take 3 mg by mouth nightly.      Pantoprazole Sodium 40 MG Oral Tab EC Take 1 tablet (40 mg total) by mouth every morning before breakfast. 30 tablet 0    TURMERIC OR Take 100 mg by mouth 2 (two) times a day.      ECHINACEA EXTRACT OR Take by mouth.      CINNAMON OR Take by mouth.      NON FORMULARY Veggie and Fruit formula in a capsule      NON FORMULARY Triple Flex Joint supplement      Cyanocobalamin (VITAMIN B 12 OR) Take by mouth.      Flaxseed, Linseed, (FLAX SEED OIL OR) Take by mouth.      Probiotic Product (PROBIOTIC DAILY OR) Take by mouth.      Ergocalciferol (VITAMIN D OR) Take by mouth. Vitamin D3      EZETIMIBE OR Take 10 mg by mouth.      Pantoprazole Sodium 40 MG Oral Tab EC Take 40 mg by mouth every morning before breakfast.      docusate sodium 100 MG Oral Cap Take 100 mg by mouth 2 (two) times daily. (Patient not taking: Reported on 2021 ) 60 capsule 0    Alum & Mag Hydroxide-Simeth 400-400-40 MG/5ML Oral Suspension Take 15 mL by mouth 4 (four) times daily as needed for Indigestion.      omeprazole 20 MG Oral Capsule Delayed Release Take 20 mg by mouth nightly.      Calcium Carbonate Antacid 500 MG Oral Chew Tab Chew 1 tablet by mouth every 4 (four) hours as needed for Heartburn.      acetaminophen 500 MG Oral Tab Take 500 mg by mouth every 4 (four) hours as needed for Pain.      vitamin E  400 UNITS Oral Cap Take 400 Units by mouth daily.      zinc sulfate 220 (50 Zn) MG Oral Cap Take 50 mg by mouth daily.      Zolpidem Tartrate 5 MG Oral Tab Take 5 mg by mouth nightly as needed for Sleep.      aspirin 81 MG Oral Tab Take 81 mg by mouth daily.      multiple vitamin Oral Chew Tab Chew 1 tablet by mouth daily.         Allergies:  Allergies   Allergen Reactions    Fish-Derived Products RASH     Patient states she can only eat shrimp and tuna without problems       ROS:   Systems were reviewed and were negative except as noted in the HPI    PHYSICAL EXAM:   Blood pressure 116/62, pulse 76, height 5' 4\" (1.626 m), weight 163 lb (73.9 kg).    General:awake, cooperative, no acute distress  HEENT: EOMI, no scleral icterus, MMM; oral pharnyx is without exudates or lesions  Neck: no lymphadenopathy; thyroid is not enlarged and without nodules  CV: RRR  Resp: non-labored breathing  Abd: soft, non-tender, non-distended  Ext: no lower extremity swelling  Neuro: Alert, Oriented X 3  Skin: no rashes, bruises  Psych: normal affect    Labs/Imaging:     Reviewed as noted in the HPI and A/P    ASSESSMENT/PLAN:   Liliya Velasquez is a 84 year old  woman with history of BMI 27, cholecystectomy, appendectomy, hysterectomy, prior tobacco use, arthritis, esophageal reflux, endometrial cancer, here for follow up    I have seen the patient in the past year regarding biliary stricture for which she has undergone ERCPs. Since being seen with me in the office last in February 2021, review of the chart notes since then having had EUS/ERCP with cholangioscopy in April 2021 when a proximal bile duct stricture was noted, s/p brushings and cholangioscopy directed biopsies, path, cytology and FISH analysis were negative for malignancy . Subsequently underwent EUS/ERCP on 8/10/21 with stent removel and stone extraction. 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 I reviewed the  office note with gastroenterology from [facility] from 9/28/21. There is a noted CT scan from 1/13/23 of the abdomen/pelvis for tender abdomen and distention with findings of multiple dilated air and fluid filled small bowel loops concerning for developing small bowel obstruction, dilated biliary system with narrowing in the mid common bile duct, mildly dilated pancreatic duct, multiple perisplenic varices, nonobstructing right renal calculus, colonic diverticulosis. There appears to have been an MRI abdomen then completed in May 2023 though the report is not readily available.    She underwent colonoscopy March 15, 2021 with findings of 6 subcentimeter colon polyps which were removed and adenomatous, pancolonic diverticulosis, small hemorrhoids.    We discussed the prior colonoscopy and findings.  Discussed at her age routine colonoscopy surveillance is not strongly recommended.  We reviewed the procedure including the benefits and risks as outlined below.  After extensive discussion and her understanding of this she strongly prefers to proceed with colonoscopy.    Recommend:  -colonoscopy with split suprep MAC at the hospital tentatively on a Monday August/September after her planned routine cardiac testing     Colonoscopy consent: I have discussed the risks, benefits, and alternatives (including stool testing) to colonoscopy with the patient [who demonstrated understanding], including but not limited to the risks of bleeding, infection, pain, as well as the risks of anesthesia and perforation all leading to prolonged hospitalization, surgical intervention, or could be life threatening. I also specifically mentioned the risk of missed lesions/polyps. All questions were answered to the patient’s satisfaction. The patient signed informed consent and elected to proceed with colonoscopy with intervention [i.e. polypectomy, biopsy, control of bleeding, etc.] as indicated. I also discussed a ride home from a family member of  friend is required and driving after the procedure with sedation is not safe or recommended.    Orders This Visit:  No orders of the defined types were placed in this encounter.    Meds This Visit:  Requested Prescriptions      No prescriptions requested or ordered in this encounter     Imaging & Referrals:  None     Russel Bearden MD  [facility] - Gastroenterology  6/25/2024

## 2024-09-10 ENCOUNTER — TELEPHONE (OUTPATIENT)
Facility: CLINIC | Age: 84
End: 2024-09-10

## 2024-09-10 NOTE — TELEPHONE ENCOUNTER
Spoke to patient. Advised per Dr. Bearden's order, no prior testing or clearance is required for her procedure.

## 2024-09-10 NOTE — TELEPHONE ENCOUNTER
Patient states she needs pre test form filled out for upcoming procedure prenatal 9/16 to be faxed to [facility] fax # [phone] to Dr. Estrada

## 2024-09-12 ENCOUNTER — TELEPHONE (OUTPATIENT)
Facility: CLINIC | Age: 84
End: 2024-09-12

## 2024-09-16 ENCOUNTER — ANESTHESIA EVENT (OUTPATIENT)
Dept: ENDOSCOPY | Facility: HOSPITAL | Age: 84
End: 2024-09-16
Payer: MEDICARE

## 2024-09-16 ENCOUNTER — HOSPITAL ENCOUNTER (OUTPATIENT)
Facility: HOSPITAL | Age: 84
Setting detail: HOSPITAL OUTPATIENT SURGERY
Discharge: HOME | End: 2024-09-16
Attending: INTERNAL MEDICINE | Admitting: INTERNAL MEDICINE
Payer: MEDICARE

## 2024-09-16 ENCOUNTER — ANESTHESIA (OUTPATIENT)
Dept: ENDOSCOPY | Facility: HOSPITAL | Age: 84
End: 2024-09-16
Payer: MEDICARE

## 2024-09-16 VITALS
RESPIRATION RATE: 14 BRPM | SYSTOLIC BLOOD PRESSURE: 141 MMHG | OXYGEN SATURATION: 97 % | BODY MASS INDEX: 32.86 KG/M2 | HEART RATE: 83 BPM | HEIGHT: 59 IN | DIASTOLIC BLOOD PRESSURE: 81 MMHG | WEIGHT: 163 LBS

## 2024-09-16 DIAGNOSIS — Z86.010 PERSONAL HISTORY OF COLONIC POLYPS: ICD-10-CM

## 2024-09-16 DIAGNOSIS — Z12.11 COLON CANCER SCREENING: ICD-10-CM

## 2024-09-16 PROCEDURE — 0DBM8ZX EXCISION OF DESCENDING COLON, VIA NATURAL OR ARTIFICIAL OPENING ENDOSCOPIC, DIAGNOSTIC: ICD-10-PCS | Performed by: INTERNAL MEDICINE

## 2024-09-16 PROCEDURE — 45385 COLONOSCOPY W/LESION REMOVAL: CPT | Performed by: INTERNAL MEDICINE

## 2024-09-16 RX ORDER — NALOXONE HYDROCHLORIDE 0.4 MG/ML
0.08 INJECTION, SOLUTION INTRAMUSCULAR; INTRAVENOUS; SUBCUTANEOUS ONCE AS NEEDED
Status: DISCONTINUED | OUTPATIENT
Start: 2024-09-16 | End: 2024-09-16

## 2024-09-16 RX ORDER — SODIUM CHLORIDE, SODIUM LACTATE, POTASSIUM CHLORIDE, CALCIUM CHLORIDE 600; 310; 30; 20 MG/100ML; MG/100ML; MG/100ML; MG/100ML
INJECTION, SOLUTION INTRAVENOUS CONTINUOUS
Status: DISCONTINUED | OUTPATIENT
Start: 2024-09-16 | End: 2024-09-16

## 2024-09-16 RX ORDER — LIDOCAINE HYDROCHLORIDE 10 MG/ML
INJECTION, SOLUTION EPIDURAL; INFILTRATION; INTRACAUDAL; PERINEURAL AS NEEDED
Status: DISCONTINUED | OUTPATIENT
Start: 2024-09-16 | End: 2024-09-16 | Stop reason: SURG

## 2024-09-16 RX ADMIN — LIDOCAINE HYDROCHLORIDE 40 MG: 10 INJECTION, SOLUTION EPIDURAL; INFILTRATION; INTRACAUDAL; PERINEURAL at 13:53:00

## 2024-09-16 RX ADMIN — SODIUM CHLORIDE, SODIUM LACTATE, POTASSIUM CHLORIDE, CALCIUM CHLORIDE: 600; 310; 30; 20 INJECTION, SOLUTION INTRAVENOUS at 13:49:00

## 2024-09-16 RX ADMIN — SODIUM CHLORIDE, SODIUM LACTATE, POTASSIUM CHLORIDE, CALCIUM CHLORIDE: 600; 310; 30; 20 INJECTION, SOLUTION INTRAVENOUS at 14:12:00

## 2024-09-16 NOTE — ANESTHESIA POSTPROCEDURE EVALUATION
Patient: Liliya Velasquez    Procedure Summary       Date: 09/16/24 Room / Location: [facility] ENDOSCOPY 01 / [facility] ENDOSCOPY    Anesthesia Start: 1349 Anesthesia Stop:     Procedure: COLONOSCOPY Diagnosis:       Colon cancer screening      Personal history of colonic polyps      (diverticulosis,hemorrhoids,polyp)    Surgeons: Russel Bearden MD Anesthesiologist: Gissel Larsen CRNA    Anesthesia Type: MAC ASA Status: 2            Anesthesia Type: MAC    Vitals Value Taken Time   /63 09/16/24 1425   Temp 97 09/16/24 1425   Pulse 80 09/16/24 1424   Resp 13 09/16/24 1424   SpO2 98 % 09/16/24 1424   Vitals shown include unfiled device data.    [facility] AN Post Evaluation:   Patient Evaluated in PACU  Patient Participation: waiting for patient participation  Level of Consciousness: sleepy but conscious  Pain Management: adequate  Airway Patency:patent  Dental exam unchanged from preop  Yes    Nausea/Vomiting: none  Cardiovascular Status: acceptable and hemodynamically stable  Respiratory Status: acceptable, room air and spontaneous ventilation  Postoperative Hydration acceptable      Gissel Larsen CRNA  9/16/2024 2:25 PM

## 2024-09-16 NOTE — OPERATIVE REPORT
Colonoscopy Report    Liliya Velasquez     1940 Age 84 year old   PCP No primary care provider on file. Endoscopist Russel Bearden MD     Date of procedure: 24    Procedure: Colonoscopy w/ snare polypectomy    Pre-operative diagnosis: colorectal cancer screening, history of adenomatous colon polyps    She underwent colonoscopy March 15, 2021 with findings of 6 subcentimeter colon polyps which were removed and adenomatous, pancolonic diverticulosis, small hemorrhoids.     Post-operative diagnosis: see impression    Sedation: monitored anesthesia care (MAC)    Consent: We discussed the risks/benefits and alternatives to this procedure, as well as the planned sedation. Informed consent was obtained from the patient after the risks of the procedure were discussed, including but not limited to bleeding, perforation, aspiration, infection, or possibility of a missed lesion as well as the risks of anesthesia including but not limited to cardiopulmonary complications. The patient signed informed consent and elected to proceed with Colonoscopy with intervention [i.e. Biopsy, control of bleeding, dilatation, polypectomy, endoscopic mucosal resection, etc.] as indicated.    Colonoscopy procedure: Once an adequate level of sedation was obtained a digital rectal exam was completed revealing normal tone and no masses palpated. Then the lubricated tip of the [id number] diagnostic pediatric video colonoscope was carefully inserted and advanced with mild difficulty (due to tortuosity and looping) to the cecum using the air insufflation technique (only Co2 was used for insufflation). The cecum was identified by localizing the trifold, the appendix and the ileocecal valve. Withdrawal was begun with thorough washing and careful examination of the colonic walls and folds. The ascending colon was viewed twice in the forward view. Photodocumentation was obtained. The bowel prep was adequate. Views of the colon  were adequate with washing. Withdrawal time was 10 minutes.    Air was then withdrawn and the endoscope was removed. The patient tolerated the procedure well. There were no immediate postoperative complications. The patient’s vital signs were monitored throughout the procedure and remained stable.    Estimated blood loss: insignificant    Specimens collected:  colon polyp    Complications: none     Colonoscopy findings:  There were scattered diverticulosis throughout the colon. Otherwise, as below.    Cecum: normal mucosa and vascular pattern    Ascending colon: normal mucosa and vascular pattern    Transverse colon: normal mucosa and vascular pattern    Descending colon: there was a 5-6 mm polyp removed by cold snare polypectomy. Otherwise, normal mucosa and vascular pattern    Sigmoid colon: normal mucosa and vascular pattern    Rectum: there were very small non-bleeding hemorrhoids. Otherwise, normal mucosa and vascular pattern.      Impression:  1. A small colon polyp removed  2. Pan-colonic diverticulosis  3. Hemorrhoids  4. Otherwise, unremarkable colonoscopy    Recommend:  1. Await pathology.   2. Colonoscopy can be considered in the future for new signs/symptoms  3. Continue your current medications  4. Increase fiber in the diet  5. Follow up with your primary care physician on a routine basis    >>>If biopsies were performed and you have not received your pathology results either by phone or letter within 2 weeks, please call our office at [phone].    [facility] - Gastroenterology  9/16/2024

## 2024-09-16 NOTE — DISCHARGE INSTRUCTIONS
Home Care Instructions for Colonoscopy with Sedation    Diet:  - Resume your regular diet as tolerated unless otherwise instructed.  - Start with light meals to minimize bloating.  - Do not drink alcohol today.    Medication:  - If you have questions about resuming your normal medications, please contact your Primary Care Physician.    Activities:  - Take it easy today. Do not return to work today.  - Do not drive today.  - Do not operate any machinery today (including kitchen equipment).    Colonoscopy:  - You may notice some rectal \"spotting\" (a little blood on the toilet tissue) for a day or two after the exam. This is normal.  - If you experience any rectal bleeding (not spotting), persistent tenderness or sharp severe abdominal pains, oral temperature over 100 degrees Fahrenheit, light-headedness or dizziness, or any other problems, contact your doctor.    **If unable to reach your doctor, please go to the [facility] Emergency Room**    - Your referring physician will receive a full report of your examination.  - If you do not hear from your doctor's office within two weeks of your biopsy, please call them for your results.    You may be able to see your laboratory results in [email or web address] between 4 and 7 business days.  In some cases, your physician may not have viewed the results before they are released to [email or web address].  If you have questions regarding your results contact the physician who ordered the test/exam by phone or via [email or web address] by choosing \"Ask a Medical Question.\"

## 2024-09-16 NOTE — H&P
History & Physical Examination    Patient Name: Liliya Velasquez  MRN: [id number]  [facility]: [id number]  YOB: 1940    Diagnosis: colorectal cancer screening, history of adenomatous colon polyps    Medications Prior to Admission   Medication Sig Dispense Refill Last Dose    benzonatate 100 MG Oral Cap    prn    gabapentin 100 MG Oral Cap TAKE ONE CAPSULE BY MOUTH EVERY MORNING AND 2 AT NIGHT   9/15/2024 at 2000    MYRBETRIQ 25 MG Oral Tablet 24 Hr Take 1 tablet (25 mg total) by mouth daily.   9/9/2024    metoprolol succinate ER 25 MG Oral Tablet 24 Hr    9/15/2024 at 2000    sucralfate 1 g Oral Tab TAKE 1 TABLET BY MOUTH WITH EACH MEAL AND AT BEDTIME   More than a month    traMADol 50 MG Oral Tab TAKE 1 TABLET BY MOUTH EVERY DAY AS NEEDED FOR SEVERE PAIN   More than a month    Na Sulfate-K Sulfate-Mg Sulf (SUPREP BOWEL PREP KIT) 17.5-3.13-1.6 GM/177ML Oral Solution Take as directed 1 each 0     NON FORMULARY Active enzyme       Ascorbic Acid (VITAMIN C OR) Take by mouth.   9/9/2024    Eszopiclone (LUNESTA) 3 MG Oral Tab Take 1 tablet (3 mg total) by mouth nightly.   9/15/2024 at 2000    Pantoprazole Sodium 40 MG Oral Tab EC Take 1 tablet (40 mg total) by mouth every morning before breakfast. 30 tablet 0 9/15/2024 at 0800    TURMERIC OR Take 100 mg by mouth 2 (two) times a day.   9/9/2024    ECHINACEA EXTRACT OR Take by mouth.   9/9/2024    CINNAMON OR Take by mouth.   9/9/2024    NON FORMULARY Veggie and Fruit formula in a capsule       NON FORMULARY Triple Flex Joint supplement       Cyanocobalamin (VITAMIN B 12 OR) Take by mouth.   9/9/2024    Flaxseed, Linseed, (FLAX SEED OIL OR) Take by mouth.   9/9/2024    Probiotic Product (PROBIOTIC DAILY OR) Take by mouth.   9/9/2024    Ergocalciferol (VITAMIN D OR) Take by mouth. Vitamin D3   9/9/2024    EZETIMIBE OR Take 10 mg by mouth.   9/15/2024 at 2000    docusate sodium 100 MG Oral Cap Take 100 mg by mouth 2 (two) times daily. 60 capsule 0     Alum & Mag  Hydroxide-Simeth 400-400-40 MG/5ML Oral Suspension Take 15 mL by mouth 4 (four) times daily as needed for Indigestion. (Patient not taking: Reported on 9/16/2024)   Not Taking    omeprazole 20 MG Oral Capsule Delayed Release Take 1 capsule (20 mg total) by mouth nightly. (Patient not taking: Reported on 6/25/2024)   9/9/2024    Calcium Carbonate Antacid 500 MG Oral Chew Tab Chew 1 tablet (500 mg total) by mouth every 4 (four) hours as needed for Heartburn. (Patient not taking: Reported on 9/16/2024)   Not Taking    acetaminophen 500 MG Oral Tab Take 1 tablet (500 mg total) by mouth every 4 (four) hours as needed for Pain.   More than a month    vitamin E 400 UNITS Oral Cap Take 1 capsule (400 Units total) by mouth daily.   9/9/2024    zinc sulfate 220 (50 Zn) MG Oral Cap Take 50 mg by mouth daily.   9/9/2024    Zolpidem Tartrate 5 MG Oral Tab Take 1 tablet (5 mg total) by mouth nightly as needed for Sleep. (Patient not taking: Reported on 9/16/2024)   Not Taking    aspirin 81 MG Oral Tab Take 1 tablet (81 mg total) by mouth daily. (Patient not taking: Reported on 9/16/2024)   Not Taking    multiple vitamin Oral Chew Tab Chew 1 tablet by mouth daily.   9/9/2024     Current Facility-Administered Medications   Medication Dose Route Frequency    lactated ringers infusion   Intravenous Continuous       Allergies:   Allergies   Allergen Reactions    Fish-Derived Products RASH and ITCHING     Patient states she can only eat shrimp and tuna without problems    Codeine RASH       Past Medical History:    Anemia    Arthritis    Diverticulitis    Endometrial cancer (HCC)    Esophageal reflux    Exposure to medical diagnostic radiation    High cholesterol    Personal history of antineoplastic chemotherapy    Visual impairment     Past Surgical History:   Procedure Laterality Date    Appendectomy      Cholecystectomy      Colonoscopy N/A 3/15/2021    Procedure: COLONOSCOPY;  Surgeon: Russel Bearden MD;  [facility]: [facility]  ENDOSCOPY    Ercp duct stent placement  2020    Ercp duct stent placement  2020    Ercp duct stent placement  10/05/2020    Ercp duct stent placement  2021    Ercp duct stent placement  2021    @[facility]    Fracture surgery      Hysterectomy      Removal gallbladder       History reviewed. No pertinent family history.  Social History     Tobacco Use    Smoking status: Former     Current packs/day: 0.00     Types: Cigarettes     Quit date: 1975     Years since quittin.7    Smokeless tobacco: Never   Substance Use Topics    Alcohol use: Not Currently       SYSTEM Check if Physical Exam is Normal If not normal, please explain:   HEENT [ X]    NECK  [ X]    HEART [ X]    LUNGS [ X]    ABDOMEN [ X]    EXTREMITIES [ X]      General:awake, cooperative, no acute distress  HEENT: EOMI, no scleral icterus, MMM; oral pharnyx is without exudates or lesions  Neck: no lymphadenopathy; thyroid is not enlarged and without nodules  CV: RRR  Resp: non-labored breathing  Abd: soft, non-tender, non-distended  Ext: no lower extremity swelling  Neuro: Alert, Oriented X 3  Skin: no rashes, bruises  Psych: normal affect  I have discussed the risks and benefits and alternatives of the procedure with the patient/family.  She understand and agreed to proceed with plan of care.   Russel Bearden MD  [facility] - Gastroenterology  2024  1:45 PM

## 2024-09-16 NOTE — ANESTHESIA PREPROCEDURE EVALUATION
Anesthesia PreOp Note    HPI:     Liliya Velasquez is a 84 year old female who presents for preoperative consultation requested by: Russel Bearden MD    Date of Surgery: 9/16/2024    Procedure(s):  COLONOSCOPY  Indication: Colon cancer screening / Personal history of colonic polyps    Relevant Problems   No relevant active problems       NPO:                         History Review:  Patient Active Problem List    Diagnosis Date Noted    Rectal bleeding     Biliary stricture (HCC)     Encounter for removal of biliary stent     Diarrhea     Small bowel obstruction (HCC) 03/12/2020    Abnormal magnetic resonance cholangiopancreatography (MRCP)     Elevated liver enzymes     RUQ pain     Leukocytosis 02/25/2020    Calculus of bile duct with acute cholangitis with obstruction 02/25/2020    Anemia, unspecified type 02/25/2020    Gastro-esophageal reflux disease without esophagitis 02/19/2020       Past Medical History:    Anemia    Arthritis    Diverticulitis    Endometrial cancer (HCC)    Esophageal reflux    Exposure to medical diagnostic radiation    High cholesterol    Personal history of antineoplastic chemotherapy    Visual impairment       Past Surgical History:   Procedure Laterality Date    Appendectomy      Cholecystectomy      Colonoscopy N/A 3/15/2021    Procedure: COLONOSCOPY;  Surgeon: Russel Bearden MD;  Location: [facility] ENDOSCOPY    Ercp duct stent placement  06/01/2020    Ercp duct stent placement  02/25/2020    Ercp duct stent placement  10/05/2020    Ercp duct stent placement  01/04/2021    Ercp duct stent placement  04/02/2021    @[facility]    Fracture surgery      Hysterectomy      Removal gallbladder         Medications Prior to Admission   Medication Sig Dispense Refill Last Dose    benzonatate 100 MG Oral Cap        gabapentin 100 MG Oral Cap TAKE ONE CAPSULE BY MOUTH EVERY MORNING AND 2 AT NIGHT       MYRBETRIQ 25 MG Oral Tablet 24 Hr Take 1 tablet (25 mg total) by mouth daily.        metoprolol succinate ER 25 MG Oral Tablet 24 Hr        sucralfate 1 g Oral Tab TAKE 1 TABLET BY MOUTH WITH EACH MEAL AND AT BEDTIME   More than a month    traMADol 50 MG Oral Tab TAKE 1 TABLET BY MOUTH EVERY DAY AS NEEDED FOR SEVERE PAIN   More than a month    Na Sulfate-K Sulfate-Mg Sulf (SUPREP BOWEL PREP KIT) 17.5-3.13-1.6 GM/177ML Oral Solution Take as directed 1 each 0     NON FORMULARY Active enzyme       Ascorbic Acid (VITAMIN C OR) Take by mouth.       Eszopiclone (LUNESTA) 3 MG Oral Tab Take 3 mg by mouth nightly.       Pantoprazole Sodium 40 MG Oral Tab EC Take 1 tablet (40 mg total) by mouth every morning before breakfast. 30 tablet 0     TURMERIC OR Take 100 mg by mouth 2 (two) times a day.       ECHINACEA EXTRACT OR Take by mouth.       CINNAMON OR Take by mouth.       NON FORMULARY Veggie and Fruit formula in a capsule       NON FORMULARY Triple Flex Joint supplement       Cyanocobalamin (VITAMIN B 12 OR) Take by mouth.       Flaxseed, Linseed, (FLAX SEED OIL OR) Take by mouth.       Probiotic Product (PROBIOTIC DAILY OR) Take by mouth.       Ergocalciferol (VITAMIN D OR) Take by mouth. Vitamin D3       EZETIMIBE OR Take 10 mg by mouth.       docusate sodium 100 MG Oral Cap Take 100 mg by mouth 2 (two) times daily. 60 capsule 0     Alum & Mag Hydroxide-Simeth 400-400-40 MG/5ML Oral Suspension Take 15 mL by mouth 4 (four) times daily as needed for Indigestion.       omeprazole 20 MG Oral Capsule Delayed Release Take 1 capsule (20 mg total) by mouth nightly. (Patient not taking: Reported on 6/25/2024)       Calcium Carbonate Antacid 500 MG Oral Chew Tab Chew 1 tablet (500 mg total) by mouth every 4 (four) hours as needed for Heartburn.       acetaminophen 500 MG Oral Tab Take 1 tablet (500 mg total) by mouth every 4 (four) hours as needed for Pain.   More than a month    vitamin E 400 UNITS Oral Cap Take 1 capsule (400 Units total) by mouth daily.       zinc sulfate 220 (50 Zn) MG Oral Cap Take 50 mg by  mouth daily.       Zolpidem Tartrate 5 MG Oral Tab Take 1 tablet (5 mg total) by mouth nightly as needed for Sleep.       aspirin 81 MG Oral Tab Take 1 tablet (81 mg total) by mouth daily.       multiple vitamin Oral Chew Tab Chew 1 tablet by mouth daily.        Current Facility-Administered Medications Ordered in Epic   Medication Dose Route Frequency Provider Last Rate Last Admin    lactated ringers infusion   Intravenous Continuous Russel Bearden MD         No current [facility]-ordered outpatient medications on file.       Allergies   Allergen Reactions    Fish-Derived Products RASH and ITCHING     Patient states she can only eat shrimp and tuna without problems    Codeine RASH       History reviewed. No pertinent family history.  Social History     Socioeconomic History    Marital status:    Tobacco Use    Smoking status: Former     Current packs/day: 0.00     Types: Cigarettes     Quit date: 1975     Years since quittin.7    Smokeless tobacco: Never   Vaping Use    Vaping status: Never Used   Substance and Sexual Activity    Alcohol use: Not Currently    Drug use: Never       Available pre-op labs reviewed.             Vital Signs:  Body mass index is 32.92 kg/m².   height is 1.499 m (4' 11\") and weight is 73.9 kg (163 lb).   Vitals:    24 0934   Weight: 73.9 kg (163 lb)   Height: 1.499 m (4' 11\")        Anesthesia Evaluation     Patient summary reviewed and Nursing notes reviewed    Airway   Mallampati: III  TM distance: >3 FB  Neck ROM: full  Dental    (+) lower dentures and upper dentures    Pulmonary - negative ROS and normal exam   Cardiovascular - negative ROS and normal exam    Neuro/Psych - negative ROS     GI/Hepatic/Renal    (+) GERD, bowel prep    Endo/Other    (+) blood dyscrasia, arthritis  Abdominal   (+) obese                 Anesthesia Plan:   ASA:  2  Plan:   MAC      I have informed Liliya Velasquez and/or legal guardian or family member of the nature of the anesthetic  plan, benefits, risks including possible dental damage if relevant, major complications, and any alternative forms of anesthetic management.   All of the patient's questions were answered to the best of my ability. The patient desires the anesthetic management as planned.  Gissel Larsen CRNA  9/16/2024 12:19 PM  Present on Admission:  **None**

## 2025-01-31 ENCOUNTER — TELEPHONE (OUTPATIENT)
Facility: CLINIC | Age: 85
End: 2025-01-31

## 2025-01-31 DIAGNOSIS — K83.8 COMMON BILE DUCT DILATATION: ICD-10-CM

## 2025-01-31 DIAGNOSIS — R93.3 ABNORMAL MAGNETIC RESONANCE CHOLANGIOPANCREATOGRAPHY (MRCP): Primary | ICD-10-CM

## 2025-01-31 DIAGNOSIS — K83.9 BILE DUCT ABNORMALITY: ICD-10-CM

## 2025-01-31 DIAGNOSIS — Z98.890 S/P ERCP: ICD-10-CM

## 2025-01-31 NOTE — TELEPHONE ENCOUNTER
Dr. Bearden,    I spoke to patient, states she saw her pcp yesterday and advised she follow up with you regarding testing for bile duct monitoring. States its been a while since it has been checked.     She only wants to see you, not interested in going back to [facility].     If an MRI is advised, she is planning to have an MRI of brain through her pcp and can have it ordered with MRCP if needed.

## 2025-01-31 NOTE — TELEPHONE ENCOUNTER
Patient calling states regards testing needed for bile duct that was advised by patient's PCP. Please call.

## 2025-02-03 NOTE — TELEPHONE ENCOUNTER
I reviewed the notes from [facility] with plans for MRI monitoring last in May 2023. I've ordered an MRI. Please let her know and also schedule a routine follow up visit.    Thanks    MD Danie Carter-[facility] - Gastroenterology  2/3/2025  3:48 PM

## 2025-02-03 NOTE — TELEPHONE ENCOUNTER
That would be ok, but yes, please let her know it may make things a bit more challenging to directly compared.    Thanks    MD Danie Carter-[facility]  [facility] - Gastroenterology  2/3/2025  4:05 PM

## 2025-02-03 NOTE — TELEPHONE ENCOUNTER
Dr. Bearden    She wants to go to [facility] in [place] because that only costs her $100 out of pocket.  Are you ok with that if I fax the order over there?  I just wanted to double check since will be comparing to let MRI.    Thank you    Patient contacted, I reviewed below message with her and she accepted below appointment.  She took down the address to [facility] office.  Your Appointments      Thursday April 17, 2025 1:30 PM  Follow Up Visit with Russel Bearden MD  [facility] ([address] [phone]

## 2025-02-07 NOTE — TELEPHONE ENCOUNTER
Trina from [facility] is requesting for a copy of the order to be faxed to [phone]

## 2025-04-01 ENCOUNTER — TELEPHONE (OUTPATIENT)
Facility: CLINIC | Age: 85
End: 2025-04-01

## 2025-04-01 NOTE — TELEPHONE ENCOUNTER
Patient calling states [facility] told patient they do not have order for MRI. Please call. Patient is scheduled for tomorrow and they are open until 5pm today. (See telephone encounter from 1/31/25)

## 2025-04-01 NOTE — TELEPHONE ENCOUNTER
Faxed MRI/MRCP order to [facility] ([phone])    With fax confirmation received at 3:54 pm.

## 2025-04-15 ENCOUNTER — TELEPHONE (OUTPATIENT)
Facility: CLINIC | Age: 85
End: 2025-04-15

## 2025-04-15 NOTE — TELEPHONE ENCOUNTER
Patient calling states if received MRI results of the bile duct from [facility] imaging. Please call.

## 2025-04-16 ENCOUNTER — TELEPHONE (OUTPATIENT)
Facility: CLINIC | Age: 85
End: 2025-04-16

## 2025-04-16 NOTE — TELEPHONE ENCOUNTER
Patient checking to see if office received results from [facility].  Please call.  Thank you.   Shift change report given to ANISHA Ortiz RN re:  SBAR, meds, behavior. [facility] score -2.

## 2025-04-16 NOTE — TELEPHONE ENCOUNTER
Spoke to patient, informed we do not have MRI results. She will provide [facility] with our fax #.

## 2025-04-16 NOTE — TELEPHONE ENCOUNTER
1st,overdue reminder letter mailed out to patient    Imaging order   Orders Placed on 2/3/2025    MRI MRCP (W+WO) (CPT=74183)

## 2025-04-17 ENCOUNTER — OFFICE VISIT (OUTPATIENT)
Dept: GASTROENTEROLOGY | Facility: CLINIC | Age: 85
End: 2025-04-17

## 2025-04-17 VITALS
DIASTOLIC BLOOD PRESSURE: 60 MMHG | BODY MASS INDEX: 27.21 KG/M2 | WEIGHT: 135 LBS | HEIGHT: 59 IN | SYSTOLIC BLOOD PRESSURE: 130 MMHG

## 2025-04-17 DIAGNOSIS — K83.8 DILATED BILE DUCT: ICD-10-CM

## 2025-04-17 DIAGNOSIS — R11.0 NAUSEA: Primary | ICD-10-CM

## 2025-04-17 DIAGNOSIS — R63.4 UNINTENTIONAL WEIGHT LOSS: ICD-10-CM

## 2025-04-17 DIAGNOSIS — K83.9 BILE DUCT ABNORMALITY: ICD-10-CM

## 2025-04-17 PROCEDURE — 3075F SYST BP GE 130 - 139MM HG: CPT | Performed by: INTERNAL MEDICINE

## 2025-04-17 PROCEDURE — 3078F DIAST BP <80 MM HG: CPT | Performed by: INTERNAL MEDICINE

## 2025-04-17 PROCEDURE — 1160F RVW MEDS BY RX/DR IN RCRD: CPT | Performed by: INTERNAL MEDICINE

## 2025-04-17 PROCEDURE — 99214 OFFICE O/P EST MOD 30 MIN: CPT | Performed by: INTERNAL MEDICINE

## 2025-04-17 PROCEDURE — 1159F MED LIST DOCD IN RCRD: CPT | Performed by: INTERNAL MEDICINE

## 2025-04-17 PROCEDURE — 3008F BODY MASS INDEX DOCD: CPT | Performed by: INTERNAL MEDICINE

## 2025-04-17 NOTE — TELEPHONE ENCOUNTER
Discussed MRI report in office today    MD Danie Carter-[facility] - Gastroenterology  4/17/2025  2:10 PM

## 2025-04-17 NOTE — PROGRESS NOTES
[facility] - Gastroenterology                                                                                                  Clinic Progress Note    Chief Complaint   Patient presents with    Follow - Up     F/u on MRI results     HPI:   Liliya Velasquez is a 84 year old   woman with history of BMI 27, cholecystectomy, appendectomy, hysterectomy, prior tobacco use, arthritis, esophageal reflux, endometrial cancer, here for follow up     Last seen in the office in June 2024 for discussion of colonoscopy    Says she is here for follow-up.  Says after her colonoscopy began having symptoms of regular nausea without vomiting.  Happened over the last several months but then has resolved.  Says she did lose weight over this time unintentionally because she was eating less.  Denies having any pain at the time or currently.  Denies any issues with bowel movements including blood in the stool.  Does not think she is continuing to lose weight.  Says her primary doctor does do blood test regularly and her liver test recently have been normal.    History, Medications, Allergies, ROS:      Past Medical History[1]   Past Surgical History[2]   Family Hx: Family History[3]   Social History: Short Social Hx on File[4]     Medications (Active prior to today's visit):  Current Medications[5]    Allergies:  Allergies[6]    ROS:   Systems were reviewed and were negative except as noted in the HPI    PHYSICAL EXAM:   Blood pressure 130/60, height 4' 11\" (1.499 m), weight 135 lb (61.2 kg).    General:awake, cooperative, no acute distress  HEENT: EOMI, no scleral icterus, MMM; oral pharnyx is without exudates or lesions  Neck: no lymphadenopathy; thyroid is not enlarged and without nodules  CV: RRR  Resp: non-labored breathing  Abd: soft, non-tender, non-distended  Ext: no lower extremity swelling  Neuro: Alert, Oriented X 3  Skin: no rashes,  bruises  Psych: normal affect    Labs/Imaging:     Reviewed as noted in the HPI and A/P    ASSESSMENT/PLAN:   Liliya Velasquez is a 84 year old   woman with history of BMI 27, cholecystectomy, appendectomy, hysterectomy, prior tobacco use, arthritis, esophageal reflux, endometrial cancer, here for follow up    I have seen the patient in the past years regarding biliary stricture for which she has undergone ERCPs. Since being seen with me in the office in February 2021, review of the chart noted since then having had EUS/ERCP with cholangioscopy in April 2021 when a proximal bile duct stricture was noted, s/p brushings and cholangioscopy directed biopsies, path, cytology and FISH analysis were negative for malignancy . Subsequently underwent EUS/ERCP on 8/10/21 with stent removel and stone extraction. 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 I reviewed the office note with gastroenterology from [facility] from 9/28/21. There was a noted CT scan from 1/13/23 of the abdomen/pelvis for tender abdomen and distention with findings of multiple dilated air and fluid filled small bowel loops concerning for developing small bowel obstruction, dilated biliary system with narrowing in the mid common bile duct, mildly dilated pancreatic duct, multiple perisplenic varices, nonobstructing right renal calculus, colonic diverticulosis. There appeared to have been an MRI abdomen then completed in May 2023 though the report was not readily available.     She underwent colonoscopy March 15, 2021 with findings of 6 subcentimeter colon polyps which were removed and adenomatous, pancolonic diverticulosis, small hemorrhoids.  She underwent colonoscopy for routine surveillance in September 2024 with findings of a small colon polyp removed, pancolonic diverticulosis, and hemorrhoids     Since then she underwent MRI 4/11/2025 with a described stable similar appearance of her biliary system  as compared to MRI from February 2023    Unclear etiology for the recent nausea and unintentional weight loss she had though symptoms have resolved.  MRI of the abdomen from last week shows chronic findings of the bile duct which based at this point from evaluation in time is thought to be benign.  We discussed no specific intervention required or recommended at this time based on the stability with lack of symptoms and reported normal liver enzymes.  We discussed if symptoms return including further unintentional weight loss consideration of further evaluation.     Recommend:  -monitor/observe  -follow up in 3-6 months    I spent 30 minutes obtaining history, evaluating the patient including a medically appropriate exam, discussing treatment options and counseling and educating the patient, reviewing the patient's chart, ordering tests/medications/procedures, and completing documentation    Orders This Visit:  No orders of the defined types were placed in this encounter.    Meds This Visit:  Requested Prescriptions      No prescriptions requested or ordered in this encounter     Imaging & Referrals:  None     Russel Bearden MD  [facility] - Gastroenterology  4/17/2025         [1]   Past Medical History:   Anemia    Arthritis    Diverticulitis    Endometrial cancer (HCC)    Esophageal reflux    Exposure to medical diagnostic radiation    High cholesterol    Personal history of antineoplastic chemotherapy    Visual impairment   [2]   Past Surgical History:  Procedure Laterality Date    Appendectomy      Cholecystectomy      Colonoscopy N/A 03/15/2021    Procedure: COLONOSCOPY;  Surgeon: Russel Bearden MD;  Location: [facility] ENDOSCOPY    Colonoscopy N/A 09/16/2024    ; diverticulosis,hemorrhoids,polyp    Colonoscopy N/A 9/16/2024    Procedure: COLONOSCOPY;  Surgeon: Russel Bearden MD;  Location: [facility] ENDOSCOPY    Ercp duct stent placement  06/01/2020    Ercp  duct stent placement  2020    Ercp duct stent placement  10/05/2020    Ercp duct stent placement  2021    Ercp duct stent placement  2021    @[facility]    Fracture surgery      Hysterectomy      Removal gallbladder     [3] No family history on file.  [4]   Social History  Socioeconomic History    Marital status:    Tobacco Use    Smoking status: Former     Current packs/day: 0.00     Types: Cigarettes     Quit date: 1975     Years since quittin.3    Smokeless tobacco: Never   Vaping Use    Vaping status: Never Used   Substance and Sexual Activity    Alcohol use: Not Currently    Drug use: Never     Social Drivers of Health      Received from [facility]    Housing Stability   [5]   Current Outpatient Medications   Medication Sig Dispense Refill    benzonatate 100 MG Oral Cap       gabapentin 100 MG Oral Cap TAKE ONE CAPSULE BY MOUTH EVERY MORNING AND 2 AT NIGHT      MYRBETRIQ 25 MG Oral Tablet 24 Hr Take 1 tablet (25 mg total) by mouth daily.      metoprolol succinate ER 25 MG Oral Tablet 24 Hr       sucralfate 1 g Oral Tab TAKE 1 TABLET BY MOUTH WITH EACH MEAL AND AT BEDTIME      traMADol 50 MG Oral Tab TAKE 1 TABLET BY MOUTH EVERY DAY AS NEEDED FOR SEVERE PAIN      Na Sulfate-K Sulfate-Mg Sulf (SUPREP BOWEL PREP KIT) 17.5-3.13-1.6 GM/177ML Oral Solution Take as directed 1 each 0    NON FORMULARY Active enzyme      Ascorbic Acid (VITAMIN C OR) Take by mouth.      Eszopiclone (LUNESTA) 3 MG Oral Tab Take 1 tablet (3 mg total) by mouth nightly.      Pantoprazole Sodium 40 MG Oral Tab EC Take 1 tablet (40 mg total) by mouth every morning before breakfast. 30 tablet 0    TURMERIC OR Take 100 mg by mouth 2 (two) times a day.      ECHINACEA EXTRACT OR Take by mouth.      CINNAMON OR Take by mouth.      NON FORMULARY Veggie and Fruit formula in a capsule      NON FORMULARY Triple Flex Joint supplement      Cyanocobalamin (VITAMIN B 12 OR) Take by mouth.      Flaxseed,  Linseed, (FLAX SEED OIL OR) Take by mouth.      Probiotic Product (PROBIOTIC DAILY OR) Take by mouth.      Ergocalciferol (VITAMIN D OR) Take by mouth. Vitamin D3      EZETIMIBE OR Take 10 mg by mouth.      docusate sodium 100 MG Oral Cap Take 100 mg by mouth 2 (two) times daily. 60 capsule 0    Alum & Mag Hydroxide-Simeth 400-400-40 MG/5ML Oral Suspension Take 15 mL by mouth 4 (four) times daily as needed for Indigestion. (Patient not taking: Reported on 9/16/2024)      omeprazole 20 MG Oral Capsule Delayed Release Take 1 capsule (20 mg total) by mouth nightly. (Patient not taking: Reported on 6/25/2024)      Calcium Carbonate Antacid 500 MG Oral Chew Tab Chew 1 tablet (500 mg total) by mouth every 4 (four) hours as needed for Heartburn. (Patient not taking: Reported on 9/16/2024)      acetaminophen 500 MG Oral Tab Take 1 tablet (500 mg total) by mouth every 4 (four) hours as needed for Pain.      vitamin E 400 UNITS Oral Cap Take 1 capsule (400 Units total) by mouth daily.      zinc sulfate 220 (50 Zn) MG Oral Cap Take 50 mg by mouth daily.      Zolpidem Tartrate 5 MG Oral Tab Take 1 tablet (5 mg total) by mouth nightly as needed for Sleep. (Patient not taking: Reported on 9/16/2024)      aspirin 81 MG Oral Tab Take 1 tablet (81 mg total) by mouth daily. (Patient not taking: Reported on 9/16/2024)      multiple vitamin Oral Chew Tab Chew 1 tablet by mouth daily.     [6]   Allergies  Allergen Reactions    Fish-Derived Products RASH and ITCHING     Patient states she can only eat shrimp and tuna without problems    Codeine RASH

## 2025-04-17 NOTE — TELEPHONE ENCOUNTER
Patient checking status on message - wants to know if office received results from [facility] (# [phone]).  Please call.  Thank you.

## 2025-04-29 NOTE — PLAN OF CARE
Patient is alert and oriented. Non-weight bearing to the left leg maintained. MRCP completed. Pain controlled with PRN morphine. Will continue to monitor.      Problem: Patient Centered Care  Goal: Patient preferences are identified and integrated in the pa pain with activity and pre-medicate as appropriate  Outcome: Progressing     Problem: RISK FOR INFECTION - ADULT  Goal: Absence of fever/infection during anticipated neutropenic period  Description  INTERVENTIONS  - Monitor WBC  - Administer growth factors and participate in their care  Description  Interventions:  - Assess communication ability and preferred communication style  - Implement communication aides and strategies  - Use visual cues when possible  - Listen attentively, be patient, do not interrup Patient axo3, presents to ED with complaint of right knee pain that started 3 days prior. Denies fall or trauma. safety maintained.

## (undated) DEVICE — KIT ENDO ORCAPOD 160/180/190

## (undated) DEVICE — DISPOSABLE HOT BIOPSY FORCEPS: Brand: DISPOSABLE HOT BIOPSY FORCEPS

## (undated) DEVICE — YANKAUER SUCTION INSTRUMENT NO CONTROL VENT, BULB TIP, CLEAR: Brand: YANKAUER

## (undated) DEVICE — KIT CLEAN ENDOKIT 1.1OZ GOWNX2

## (undated) DEVICE — Device: Brand: DEFENDO AIR/WATER/SUCTION AND BIOPSY VALVE

## (undated) DEVICE — KIT MFLD FOR SPEC COLL

## (undated) DEVICE — LINE MNTR ADLT SET O2 INTMD

## (undated) DEVICE — CO2 CANNULA,SSOFT,ADLT,7O2,4CO2,FEMALE: Brand: MEDLINE

## (undated) DEVICE — BALLOON DILATATION CATHETER: Brand: HURRICANE™ RX

## (undated) DEVICE — RETRIEVAL BALLOON CATHETER: Brand: EXTRACTOR™ PRO RX

## (undated) DEVICE — Device: Brand: CUSTOM PROCEDURE KIT

## (undated) DEVICE — SNARE CAPTIFLEX MICRO-OVL OLY

## (undated) DEVICE — 35 ML SYRINGE REGULAR TIP: Brand: MONOJECT

## (undated) DEVICE — LASSO POLYPECTOMY SNARE: Brand: LASSO

## (undated) DEVICE — ACCESS AND DELIVERY CATHETER: Brand: SPYSCOPE DS

## (undated) DEVICE — CONMED SCOPE SAVER BITE BLOCK, 20X27 MM: Brand: SCOPE SAVER

## (undated) DEVICE — SINGLE-USE BIOPSY FORCEPS: Brand: SPYBITE

## (undated) DEVICE — WIREGUIDED CYTOLOGY BRUSH: Brand: RX CYTOLOGY BRUSH

## (undated) DEVICE — SYRINGE/GUAGE ASSEMBLY

## (undated) DEVICE — MEDI-VAC NON-CONDUCTIVE SUCTION TUBING: Brand: CARDINAL HEALTH

## (undated) DEVICE — OASIS, ONE ACTION STENT INTRODUCTION SYSTEM: Brand: OASIS

## (undated) DEVICE — LOCKING DEVICE RX & BIOPSY CAP

## (undated) DEVICE — SPHINCTEROTOME: Brand: DREAMTOME™ RX 44

## (undated) DEVICE — SYRINGE, LUER SLIP, STERILE, 60ML: Brand: MEDLINE

## (undated) DEVICE — FORCEP RADIAL JAW 4

## (undated) DEVICE — MEDI-VAC NON-CONDUCTIVE SUCTION TUBING 6MM X 1.8M (6FT.) L: Brand: CARDINAL HEALTH

## (undated) DEVICE — HYDRA JAGWIRE

## (undated) DEVICE — SNARE ENDOSCOPIC 10MM ROUND

## (undated) DEVICE — SNARE OPTMZ PLPCTM TRP

## (undated) DEVICE — GUIDEWIRE ESCP .035IN 450CM

## (undated) DEVICE — V2 SPECIMEN COLLECTION TRAY: Brand: NEPTUNE

## (undated) DEVICE — GUIDEWIRE ESCP HPRFR STRG TIP

## (undated) DEVICE — COTTON-LEUNG BILIARY STENT
Type: IMPLANTABLE DEVICE | Site: BILIARY | Status: NON-FUNCTIONAL
Brand: COTTON-LEUNG

## (undated) DEVICE — SINGLE-USE BIOPSY FORCEPS: Brand: RADIAL JAW 4

## (undated) DEVICE — HOWELL D.A.S.H. SPHINCTEROTOME: Brand: D.A.S.H.

## (undated) NOTE — IP AVS SNAPSHOT
Patient Demographics     Address  [address] [place] [address] Phone  [phone] [facility])      Emergency Contact(s)     Name Relation Home Work [address] Daughter   Faye Pluck Daughter   [phone]    [employer] [facility] Beckie Ganser, MD         metRONIDAZOLE 500 MG Tabs  Commonly known as:  FLAGYL  Next dose due: This afternoon      Take 1 tablet (500 mg total) by mouth every 8 (eight) hours for 7 days.   Stop taking on:  March 6, 2020   Beckie Ganser, MD         multiple [id number] docusate sodium (COLACE) cap 100 mg 02/29/20 0759 Given      [id number] levofloxacin (LEVAQUIN) tab 750 mg 02/29/20 0759 Given      [id number] metRONIDAZOLE (FLAGYL) tab 500 mg 02/28/20 1522 Given      [id number] metRONIDAZOLE (FLAGYL) tab 500 mg 0 Calculated Osmolality 290 275 - 295 mOsm/kg — [facility]   GFR, Non-African American 90 >=60 — [facility]   GFR, African-American 104 >=60 — [facility]   ALT 14 13 - 56 U/L — [facility]   AST 14 15 - 37 U/L  [facility]   Alkaline Phosphatase 257 Trimethoprim/Sulfa >=320  Resistant                     Pending Labs     Order Current Status    BLOOD CULTURE Preliminary result    BLOOD CULTURE Preliminary result         H&P - H&P Note      H&P signed by Polina Goldstein MD at 2/26/2020  4:06 PM  V aspirin 81 MG Oral Tab, Take 81 mg by mouth daily. , Disp: , Rfl: , 2/25/2020 at Unknown time  multiple vitamin Oral Chew Tab, Chew 1 tablet by mouth daily. , Disp: , Rfl: , 2/25/2020 at Unknown time  docusate sodium 50 MG Oral Cap, Take 50 mg by mouth 2 (tw discussed the subsequent MRCP which I reviewed with radiology which doesn't necessarily suggest a large common bile duct stone but perhaps sludge and a common bile duct stricture.  We discussed ERCP is indicated at this time for evaluation of the stricture Consults signed by Marita Kirkland MD at 2/26/2020 12:08 PM     Author:  Marita Kirkland MD Service:  Gastroenterology Author Type:  Physician    Filed:  2/26/2020 12:08 PM Date of Service:  2/26/2020 11:53 AM Status:  Addendum    :  Marita Kirkland On admission she was noted to have a bilirubin of 3.2 which subsequently improved to 1.9 today. Her AST ALT also normalized. Her alk phos is mildly elevated as well.   She had a leukocytosis to 5 25,000, which improved to 15,000 today after fluids and ant •  morphINE sulfate (PF) 2 MG/ML injection 1 mg, 1 mg, Intravenous, Q4H PRN  •  0.9% NaCl infusion, , Intravenous, Continuous[SK. 2]    Review of Systems:  CONSTITUTIONAL: Positive for chills at home  EYES:  negative for diplopia   RESPIRATORY:  negative fo Imaging:[SK.1]  Xr Chest Ap Portable  (cpt=71045)    Result Date: 2/25/2020  CONCLUSION:  1. No acute airspace disease. Mild bibasilar scarring/atelectasis.     Dictated by (CST): Bailey Verma MD on 2/25/2020 at 16:04     Approved by (CST): Bailey Verma during dictation, discrepancies may still exist.[SK.1]       Electronically signed by Margret Villa MD on 2/26/2020 12:08 PM   Attribution Key    SK. 1 - Margret Villa MD on 2/26/2020 11:53 AM  SK. 2 - Margret Villa MD on 2/26/2020 11:58 AM  SK. 3 - Approx Degree of Impairment: 64.91% has been calculated based on documentation in the [facility] '6 clicks' Inpatient Basic Mobility Short Form.   Research supports that patients with this level of impairment may benefit from ALVARO with PT / OT to promote maximal How much help from another person does the patient currently need. ..   -   Moving to and from a bed to a chair (including a wheelchair)?: A Lot   -   Need to walk in hospital room?: Total   -   Climbing 3-5 steps with a railing?: Total     AM-PAC Score:  R to assist with steering   Goal #4     Goal #4   Current Status     Goal #5 Patient to demonstrate independence with home activity/exercise instructions provided to patient in preparation for discharge. [DF.1]            Attribution Joyce    DF. 1 - Connie Browning for optimal comfort nd support of LLE to prevent LE swelling. Therex performed in chair ankle pumps quad sets and RLE heel slides and SLR as tolerable in pain free ROM. UE therex also reviewed as per her previous therex performed in ALVARO setting.  Pt is on t • REMOVAL GALLBLADDER[DF.2]         HOME SITUATION[DF.1]  Type of Home: House   Home Layout: Two level                Lives With: Alone(son lives down stairs)[DF.2]             Prior Level of [place]: Lives at home alone son lives down stairs previous -   Moving from lying on back to sitting on the side of the bed?: A Little[DF.2]   How much help from another person does the patient currently need. ..[DF.1]   -   Moving to and from a bed to a chair (including a wheelchair)?: A Lot   -   Need to walk in h instructions provided to patient in preparation for discharge. Goal #5   Current Status    Goal #6    Goal #6  Current Status[DF.1]         Attribution Joyce    DF. 1 - Inocencia Waterman PT, DPT [facility] on 2/26/2020 11:21 AM  DF. 2 - Inocencia Waterman PT, DPT [facility] on this session, pt limited by[CS. 1] NWB status and pain[CS.4]. Pt benefited from use of r/w[CS.1],[CS. 3] verbal cues for NWB, and raised surfaces[CS.1] (bedside chair with firm blankets)[CS.4] to maximize participation.      Pt completed supine <> sit EOB wit Anemia, unspecified type[CS.2]      Past Medical History[CS.1]  Past Medical History:   Diagnosis Date   • Anemia    • Arthritis    • Diverticulitis    • Esophageal reflux[CS.2]        Past Surgical History[CS.1]  Past Surgical History:   Procedure Later Standardized Score (AM-PAC Scale): 37.26  CMS Modifier (G-Code): CK[CS.2]    FUNCTIONAL TRANSFER ASSESSMENT[CS.1]  Supine to Sit : Minimum assistance  Sit to Stand: Maximum assistance(x2)[CS. 2]  Toilet Transfer: NT: anticipate max A with rolling chair  Edith No notes of this type exist for this encounter. SLP Notes    No notes of this type exist for this encounter.      Multidisciplinary Problems     Active Goals        Problem: Patient/Family Goals    Goal Priority Disciplines Outcome Interventions   Diogo

## (undated) NOTE — LETTER
4/16/2025          Liliya Velasquez    [address]    [place] [address]         Dear Liliya,    Our records indicate that the tests ordered for you by Russel Bearden MD  have not been done.  If you have, in fact, already completed the tests or you do not wish to have the tests done, please contact our office at THE NUMBER LISTED BELOW.  Otherwise, please proceed with the testing.  Enclosed is a duplicate order for your convenience.  Imaging order   Orders Placed on 2/3/2025  MRI MRCP (W+WO) (CPT=74183)    To schedule a test at any [facility], call Central Scheduling at [phone], Monday through Friday between 7:30am to 6pm and on Saturday between 8am and 1pm.   Evening and weekend appointments for your exam are available.   Please call [facility] at [phone] at [place]  to schedule your Imaging order.     Sincerely,    Russel Bearden MD  [facility] MEDICAL [facility], [facility]  [address]  [place] [id number] [phone]

## (undated) NOTE — IP AVS SNAPSHOT
[facility]            (For Outpatient Use Only) Initial Admit Date: 2/25/2020   Inpt/Obs Admit Date: Inpt: 2/25/20 / Obs: N/A   Discharge Date:    Buddy Hernández:  [de-identified]   MRN: [de-identified]   CSN: [id number]   CEID: [id number]        MQE Subscriber ID:  Pt Rel to Subscriber:    Hospital Account Financial Class: Medicare Advantage    February 29, 2020

## (undated) NOTE — LETTER
12/18/2023    Liliya Velasquez        [address]        [place] [address]            Dear Liliya Velasquez,      Our records indicate that you are due for an appointment for a Colonoscopy with Russel Bearden MD. Our doctors are booking out about 3-6 months in advance for procedures.    Please call our office to schedule a phone screening appointment to plan for the procedure(s).   Your medical well-being is important to us.    If your insurance requires a referral, please call your primary care office to request one.      Thank you,      The Physicians and Staff at [facility]

## (undated) NOTE — IP AVS SNAPSHOT
Patient Demographics     Address  [address] [place] [address] Phone  [phone] [facility])      Emergency Contact(s)     Name Relation Home Work [address] Daughter   Rosey Zavala Daughter   [phone]    MARIBEL SOLIMAN [facility] docusate sodium 100 MG Caps  Commonly known as:  COLACE  Next dose due:  TONIGHT      Take 100 mg by mouth 2 (two) times daily.    Pattie Correa MD         Enoxaparin Sodium 40 MG/0.4ML Soln  Commonly known as:  LOVENOX  Next dose due:  TOMORROW MOR SpO2  98 % Filed at 03/18/2020 1141      Patient's Most Recent Weight       Most Recent Value   Patient Weight  72.6 kg (160 lb)      Lab Results Last 24 Hours    No matching results found     Microbiology Results (All)     Procedure Component Value Units Past Medical History:   Diagnosis Date   • Anemia    • Arthritis    • Diverticulitis    • Esophageal reflux      Past Surgical History:   Procedure Laterality Date   • ENDOSCOPIC RETROGRADE CHOLANGIOPANCREATOGRAPHY (ERCP) N/A 2/26/2020    Performed by Omar Herrera multiple vitamin Oral Chew Tab, Chew 1 tablet by mouth daily. , Disp: , Rfl: , 3/12/2020 at Unknown time  docusate sodium 50 MG Oral Cap, Take 50 mg by mouth 2 (two) times daily. , Disp: , Rfl: , 2/25/2020 at Unknown time        Review of Systems:  Constitut TP 6.7 03/12/2020    AST 23 03/12/2020    ALT 9 03/12/2020       Imaging:  Reviewed     Assessment:  Patient Active Problem List:     Leukocytosis     Calculus of bile duct with acute cholangitis with obstruction     Anemia, unspecified type     Abnormal  1940 MRN [id number]   Location [facility] 4W/SW/SE Attending Michele Muir MD   Hosp Day # 1 PCP No primary care provider on file.      Date of Admission:  3/12/2020  Date of Consult:  3/13/2020    Reason for Consultation:  abd pain    Hist Enoxaparin Sodium 40 MG/0.4ML Subcutaneous Solution, Inject 40 mg into the skin daily. , Disp: , Rfl: , 3/12/2020 at Unknown time  Alum & Mag Hydroxide-Simeth 400-400-40 MG/5ML Oral Suspension, Take 15 mL by mouth 4 (four) times daily as needed for [employer] Abdomen:  Soft, mild distention lower quadrants    Mild tenderness at sites   Extremities:  Splint/cast left ler    Skin: Normal texture and turgor.     Laboratory Data:  Lab Results   Component Value Date    WBC 8.1 03/12/2020    HGB 11.0 (L) 03/12/2020 areas indicated in the order with gray scale and colorflow of the main vessels where appropriate. Areas scanned:  Right upper quadrant. FINDINGS:  LIVER:   Normal.  Normal size, echotexture and color flow. No masses or duct dilatation.   GALLBLADDER:   T Intrahepatic and extrahepatic biliary ductal dilation with the common bile duct measuring 1.8 cm diameter. Common bile duct stent. Cholecystectomy. PANCREAS: No lesion, fluid collection, ductal dilatation, or atrophy. SPLEEN: No enlargement.   ADRENALS: peripheral enhancement surrounding the common bile duct, favored to reflect postprocedural change; less likely coexistent cholangitis. Please correlate with laboratory parameters. 3. Trace mesenteric free fluid. Trace perihepatic ascites.  4. Cholecystect proximal common duct that appears narrow and shelf-like on the coronal images. The common duct measures 2.0 cm proximal to this level  and 2.3 cm distal to this level with further gradual tapering of the common duct more distally at the ampulla.   There is patient's age and post cholecystectomy state. There appears to be a thin shelf-like stricture in the proximal common duct without worsening of upstream biliary dilatation, which favors a benign stricture.   Amorphous layering filling defects are noted in t Some air distally but possible transition pt.  Seen  No closed loop   Conservative rx now   If doesn't open soon may need surgical intervention    Cathryn Wiggins  3/13/2020  8:29 AM[RG.1]    Electronically signed by Frankey Pean., MD on 3/13/2020  8:3 soreness. Pt at end of session was left up in chair , needs within reach , heel protected on left LE , alarm set and reinforced need for assisted mobility and use of call light.       The patient's Approx Degree of Impairment: 68.66% has been calculated wheelchair, bedside commode, etc.): A Lot   -   Moving from lying on back to sitting on the side of the bed?: A Little   How much help from another person does the patient currently need. ..   -   Moving to and from a bed to a chair (including a wheelchair) Current Status  pt stood approximately 20 sec for SPT to chair using a RW pt able to maintain NON WB status for left LE    Goal #5 Patient to demonstrate independence with home activity/exercise instructions provided to patient in preparation for discharge Pt instructed on plan of care, goal of activity. Pt verbalized her NWB of LLE --pt maintained this independently throughout activity. Immobilizer on at all times.  Pt donned depends with Min A in supine, pt sitting self straight up in bed and turning to angélica -   Putting on and taking off regular upper body clothing?: A Little  -   Taking care of personal grooming such as brushing teeth?: A Little  -   Eating meals?: None    AM-PAC Score:  Score: 16  Approx Degree of Impairment: 53.32%  Standardized Score (AM-P - PT/OT  - See additional Care Plan goals for specific interventions    Patient/Family Short Term Goal     Interdisciplinary Adequate for Discharge    Description:  Patient's Short Term Goal: Patient will be free of pain upon discharge    Interventions:

## (undated) NOTE — LETTER
[facility]  Administration of Anesthesia  Liliya GOODE agree to be cared for by a physician anesthesiologist alone and/or with a nurse anesthetist, who is specially trained to monitor me and give me medicine to put me to sleep or keep me comfortable during my procedure    I understand that my anesthesiologist and/or anesthetist is not an employee or agent of [facility] or [employer]. He or she works for [facility]    As the patient asking for anesthesia services, I agree to:  Allow the anesthesiologist (anesthesia doctor) to give me medicine and do additional procedures as necessary. Some examples are: Starting or using an “IV” to give me medicine, fluids or blood during my procedure, and having a breathing tube placed to help me breathe when I’m asleep (intubation). In the event that my heart stops working properly, I understand that my anesthesiologist will make every effort to sustain my life, unless otherwise directed by [facility] Do Not Resuscitate documents.  Tell my anesthesia doctor before my procedure:  If I am pregnant.  The last time that I ate or drank.  iii. All of the medicines I take (including prescriptions, herbal supplements, and pills I can buy without a prescription (including street drugs/illegal medications). Failure to inform my anesthesiologist about these medicines may increase my risk of anesthetic complications.  iv.If I am allergic to anything or have had a reaction to anesthesia before.  I understand how the anesthesia medicine will help me (benefits).  I understand that with any type of anesthesia medicine there are risks:  The most common risks are: nausea, vomiting, sore throat, muscle soreness, damage to my eyes, mouth, or teeth (from breathing tube placement).  Rare risks include: remembering what happened during my procedure, allergic reactions to medications, injury to my airway, heart, lungs, vision, nerves, or  muscles and in extremely rare instances death.  My doctor has explained to me other choices available to me for my care (alternatives).  Pregnant Patients (“epidural”):  I understand that the risks of having an epidural (medicine given into my back to help control pain during labor), include itching, low blood pressure, difficulty urinating, headache or slowing of the baby’s heart. Very rare risks include infection, bleeding, seizure, irregular heart rhythms and nerve injury.  Regional Anesthesia (“spinal”, “epidural”, & “nerve blocks”):  I understand that rare but potential complications include headache, bleeding, infection, seizure, irregular heart rhythms, and nerve injury.    _____________________________________________________________________________  Patient (or Representative) Signature/Relationship to Patient  Date   Time    _____________________________________________________________________________   Name (if used)    Language/Organization   Time    _____________________________________________________________________________  Nurse Anesthetist Signature     Date   Time  _____________________________________________________________________________  Anesthesiologist Signature     Date   Time  I have discussed the procedure and information above with the patient (or patient’s representative) and answered their questions. The patient or their representative has agreed to have anesthesia services.    _____________________________________________________________________________  Witness        Date   Time  I have verified that the signature is that of the patient or patient’s representative, and that it was signed before the procedure  Patient Name: Liliya Velasquez     : 1940                 Printed: 9/10/2024 at 2:21 PM    Medical Record #: [id number]                                            Page 1 of 1  ----------ANESTHESIA CONSENT----------

## (undated) NOTE — LETTER
3/11/2020                      [place] [address] [address], [address]         Dear Dante Dyson,    This letter is to inform you that our office has made several attempts to reach you by phone without success.   We were atte

## (undated) NOTE — LETTER
[address], [place]  Authorization for Invasive Procedures  1.  I hereby authorize Dr. Low Oliver , my physician and whomever may be designated as the doctor's assistant, to perform the following operation and/or procedure:  Endosc 5. I consent to the photographing of the operations or procedures to be performed for the purposes of advancing medicine, science, and/or education, provided my identity is not revealed.  If the procedure has been videotaped, the physician/surgeon will obta __________ Time: ___________    Statement of Physician  My signature below affirms that prior to the time of the procedure, I have explained to the patient and/or her legal representative, the risks and benefits involved in the proposed treatment and any r

## (undated) NOTE — LETTER
[facility]  [address], [place], [place]    Authorization for Surgical Operation and Procedure                               I hereby authorize Russel Bearden MD, my physician and his/her assistants (if applicable), which may include medical students, residents, and/or fellows, to perform the following surgical operation/ procedure and administer such anesthesia as may be determined necessary by my physician: Operation/Procedure name (s) COLONOSCOPY on Liliya Velasquez   2.   I recognize that during the surgical operation/procedure, unforeseen conditions may necessitate additional or different procedures than those listed above.  I, therefore, further authorize and request that the above-named surgeon, assistants, or designees perform such procedures as are, in their judgment, necessary and desirable.    3.   My surgeon/physician has discussed prior to my surgery the potential benefits, risks and side effects of this procedure; the likelihood of achieving goals; and potential problems that might occur during recuperation.  They also discussed reasonable alternatives to the procedure, including risks, benefits, and side effects related to the alternatives and risks related to not receiving this procedure.  I have had all my questions answered and I acknowledge that no guarantee has been made as to the result that may be obtained.    4.   Should the need arise during my operation/procedure, which includes change of level of care prior to discharge, I also consent to the administration of blood and/or blood products.  Further, I understand that despite careful testing and screening of blood or blood products by collecting agencies, I may still be subject to ill effects as a result of receiving a blood transfusion and/or blood products.  The following are some, but not all, of the potential risks that can occur: fever and allergic reactions, hemolytic reactions, transmission of diseases  such as Hepatitis, AIDS and Cytomegalovirus (CMV) and fluid overload.  In the event that I wish to have an autologous transfusion of my own blood, or a directed donor transfusion, I will discuss this with my physician.  Check only if Refusing Blood or Blood Products  I understand refusal of blood or blood products as deemed necessary by my physician may have serious consequences to my condition to include possible death. I hereby assume responsibility for my refusal and release the hospital, its personnel, and my physicians from any responsibility for the consequences of my refusal.    o  Refuse   5.   I authorize the use of any specimen, organs, tissues, body parts or foreign objects that may be removed from my body during the operation/procedure for diagnosis, research or teaching purposes and their subsequent disposal by hospital authorities.  I also authorize the release of specimen test results and/or written reports to my treating physician on the hospital medical staff or other referring or consulting physicians involved in my care, at the discretion of the Pathologist or my treating physician.    6.   I consent to the photographing or videotaping of the operations or procedures to be performed, including appropriate portions of my body for medical, scientific, or educational purposes, provided my identity is not revealed by the pictures or by descriptive texts accompanying them.  If the procedure has been photographed/videotaped, the surgeon will obtain the original picture, image, videotape or CD.  The hospital will not be responsible for storage, release or maintenance of the picture, image, tape or CD.    7.   I consent to the presence of a  or observers in the operating room as deemed necessary by my physician or their designees.    8.   I recognize that in the event my procedure results in extended X-Ray/fluoroscopy time, I may develop a skin reaction.    9. If I have a Do Not Attempt  Resuscitation (DNAR) order in place, that status will be suspended while in the operating room, procedural suite, and during the recovery period unless otherwise explicitly stated by me (or a person authorized to consent on my behalf). The surgeon or my attending physician will determine when the applicable recovery period ends for purposes of reinstating the DNAR order.  10. Patients having a sterilization procedure: I understand that if the procedure is successful the results will be permanent and it will therefore be impossible for me to inseminate, conceive, or bear children.  I also understand that the procedure is intended to result in sterility, although the result has not been guaranteed.   11. I acknowledge that my physician has explained sedation/analgesia administration to me including the risk and benefits I consent to the administration of sedation/analgesia as may be necessary or desirable in the judgment of my physician.    I CERTIFY THAT I HAVE READ AND FULLY UNDERSTAND THE ABOVE CONSENT TO OPERATION and/or OTHER PROCEDURE.     ____________________________________  _________________________________        ______________________________  Signature of Patient    Signature of Responsible Person                Printed Name of Responsible Person                                      ____________________________________  _____________________________                ________________________________  Signature of Witness        Date  Time         Relationship to Patient    STATEMENT OF PHYSICIAN My signature below affirms that prior to the time of the procedure; I have explained to the patient and/or his/her legal representative, the risks and benefits involved in the proposed treatment and any reasonable alternative to the proposed treatment. I have also explained the risks and benefits involved in refusal of the proposed treatment and alternatives to the proposed treatment and have answered the patient's  questions. If I have a significant financial interest in a co-management agreement or a significant financial interest in any product or implant, or other significant relationship used in this procedure/surgery, I have disclosed this and had a discussion with my patient.     _____________________________________________________              _____________________________  (Signature of Physician)                                                                                         (Date)                                   (Time)  Patient Name: Liliya Velasquez      : 1940      Printed: 9/10/2024     Medical Record #: [id number]                                      Page 1 of 1

## (undated) NOTE — IP AVS SNAPSHOT
[facility]            (For Outpatient Use Only) Initial Admit Date: 3/12/2020   Inpt/Obs Admit Date: Inpt: 3/12/20 / Obs: N/A   Discharge Date:    Carylon Kawasaki:  [de-identified]   MRN: [de-identified]   CSN: [id number]   CEID: [id number]        FQE Hospital Account Financial Class: Medicare Advantage    March 18, 2020